# Patient Record
Sex: FEMALE | Race: WHITE | NOT HISPANIC OR LATINO | Employment: UNEMPLOYED
[De-identification: names, ages, dates, MRNs, and addresses within clinical notes are randomized per-mention and may not be internally consistent; named-entity substitution may affect disease eponyms.]

---

## 2018-01-02 ENCOUNTER — RECORDS - HEALTHEAST (OUTPATIENT)
Dept: ADMINISTRATIVE | Facility: OTHER | Age: 43
End: 2018-01-02

## 2020-03-11 ENCOUNTER — TRANSFERRED RECORDS (OUTPATIENT)
Dept: HEALTH INFORMATION MANAGEMENT | Facility: CLINIC | Age: 45
End: 2020-03-11

## 2020-03-16 NOTE — TELEPHONE ENCOUNTER
RECORDS RECEIVED FROM: External   Date of Appt: 5.11.2020   NOTES (FOR ALL VISITS) STATUS DETAILS   OFFICE NOTE from referring provider N/A    OFFICE NOTE from other specialist Care Everywhere 3.6.2020 John Mason   DISCHARGE SUMMARY from hospital N/A    DISCHARGE REPORT from the ER N/A    OPERATIVE REPORT N/A    MEDICATION LIST Care Everywhere    IMAGING  (FOR ALL VISITS)     EMG N/A    EEG N/A    MRI (HEAD, NECK, SPINE) In process 3.12.2020 Spine cervical, [facility]   LUMBAR PUNCTURE N/A    JACEY Scan N/A    CT (HEAD, NECK, SPINE) N/A       Action MJ 3.16.2020 12:36 PM   Action Taken Requested image from John and any related med recs from [facility].  3-16: resolved MRI from John in PACS

## 2020-03-25 ENCOUNTER — VIRTUAL VISIT (OUTPATIENT)
Dept: URGENT CARE | Facility: CLINIC | Age: 45
End: 2020-03-25
Payer: COMMERCIAL

## 2020-05-11 ENCOUNTER — VIRTUAL VISIT (OUTPATIENT)
Dept: NEUROLOGY | Facility: CLINIC | Age: 45
End: 2020-05-11
Payer: COMMERCIAL

## 2020-05-11 ENCOUNTER — PRE VISIT (OUTPATIENT)
Dept: NEUROLOGY | Facility: CLINIC | Age: 45
End: 2020-05-11

## 2020-05-11 DIAGNOSIS — G43.909 NONINTRACTABLE CHRONIC MIGRAINE: Primary | ICD-10-CM

## 2020-05-11 RX ORDER — ZOLMITRIPTAN 5 MG/1
TABLET, ORALLY DISINTEGRATING ORAL
COMMUNITY
Start: 2020-03-03 | End: 2020-07-02

## 2020-05-11 RX ORDER — TOPIRAMATE 25 MG/1
75 TABLET, FILM COATED ORAL
COMMUNITY
Start: 2020-05-01 | End: 2020-07-02

## 2020-05-11 RX ORDER — PROMETHAZINE HYDROCHLORIDE 12.5 MG/1
TABLET ORAL
COMMUNITY
Start: 2020-03-05 | End: 2023-10-19

## 2020-05-11 RX ORDER — ONDANSETRON 8 MG/1
8 TABLET, ORALLY DISINTEGRATING ORAL
COMMUNITY
Start: 2020-02-28 | End: 2023-10-19

## 2020-05-11 NOTE — LETTER
Date:June 8, 2020      Patient was self referred, no letter generated. Do not send.        [facility] Health Information

## 2020-05-11 NOTE — PATIENT INSTRUCTIONS
Plan:  Headache calender for frequency, severity and response  to treatment .   Stress reduction  drink water about 8-10 glasses per day, avoid caffeine/tea/alcohol. Avoid frequent (>2 days per week) acute analgesics use  Reduce computer screen exposure and frequent brekas  Exercise  Vitamin B2 (riboflavin) 400 mg daily OTC for headache prevention  Continue topiramate for migraine headache prevention  Acute migraine headache -sumatriptan injection. Do not take it the same day with zolmitriptan-wait at least 24 hours in between   Discussed headache common triggers and importance of rest, hydration, food triggers and sleep role in headache prevention. Non pharmacological approaches of stress reduction in headache prevention.   Follow up in 2-3 months or sooner if needed
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
## 2020-07-02 ENCOUNTER — VIRTUAL VISIT (OUTPATIENT)
Dept: NEUROLOGY | Facility: CLINIC | Age: 45
End: 2020-07-02
Payer: COMMERCIAL

## 2020-07-02 RX ORDER — OMEPRAZOLE 40 MG/1
40 CAPSULE, DELAYED RELEASE ORAL
COMMUNITY
Start: 2020-05-18 | End: 2023-10-19

## 2020-07-02 RX ORDER — TOPIRAMATE 25 MG/1
75 TABLET, FILM COATED ORAL AT BEDTIME
Qty: 90 TABLET | Refills: 6 | Status: SHIPPED | OUTPATIENT
Start: 2020-07-02 | End: 2020-08-04

## 2020-07-02 RX ORDER — SUMATRIPTAN 100 MG/1
100 TABLET, FILM COATED ORAL
Qty: 12 TABLET | Refills: 3 | Status: SHIPPED | OUTPATIENT
Start: 2020-07-02 | End: 2021-03-25

## 2020-07-02 NOTE — PATIENT INSTRUCTIONS
Plan:  Continue topiramate at 75 mg and monitor for any side effects especially mood changes, depression, vision changes, kidney stones, stay hydrated to decrease side effect of kidney stones. Call with any concerns.   Sumatriptan as needed. New prescription for sumatriptan oral sent and side effects reviewed-possible interaction with Prozac and risk of serotonin syndrome  Follow up in 2-3 months via video or sooner if needed    Post epidural injections headaches-If positional headache reoccur or getting worse -call CDI back. Stay hydrated.           Patient Education     Patient Education    Topiramate Oral capsule, extended-release    Topiramate Oral capsule, sprinkles    Topiramate Oral tablet  Topiramate Oral tablet  What is this medicine?  TOPIRAMATE (toe PYRE a mate) is used to treat seizures in adults or children with epilepsy. It is also used for the prevention of migraine headaches.  This medicine may be used for other purposes; ask your health care provider or pharmacist if you have questions.  What should I tell my health care provider before I take this medicine?  They need to know if you have any of these conditions:    bleeding disorders    cirrhosis of the liver or liver disease    diarrhea    glaucoma    kidney stones or kidney disease    low blood counts, like low white cell, platelet, or red cell counts    lung disease like asthma, obstructive pulmonary disease, emphysema    metabolic acidosis    on a ketogenic diet    schedule for surgery or a procedure    suicidal thoughts, plans, or attempt; a previous suicide attempt by you or a family member    an unusual or allergic reaction to topiramate, other medicines, foods, dyes, or preservatives    pregnant or trying to get pregnant    breast-feeding  How should I use this medicine?  Take this medicine by mouth with a glass of water. Follow the directions on the prescription label. Do not crush or chew. You may take this medicine with meals. Take your  medicine at regular intervals. Do not take it more often than directed.  Talk to your pediatrician regarding the use of this medicine in children. Special care may be needed. While this drug may be prescribed for children as young as 2 years of age for selected conditions, precautions do apply.  Overdosage: If you think you have taken too much of this medicine contact a poison control center or emergency room at once.  NOTE: This medicine is only for you. Do not share this medicine with others.  What if I miss a dose?  If you miss a dose, take it as soon as you can. If your next dose is to be taken in less than 6 hours, then do not take the missed dose. Take the next dose at your regular time. Do not take double or extra doses.  What may interact with this medicine?  Do not take this medicine with any of the following medications:    probenecid  This medicine may also interact with the following medications:    acetazolamide    alcohol    amitriptyline    aspirin and aspirin-like medicines    birth control pills    certain medicines for depression    certain medicines for seizures    certain medicines that treat or prevent blood clots like warfarin, enoxaparin, dalteparin, apixaban, dabigatran, and rivaroxaban    digoxin    hydrochlorothiazide    lithium    medicines for pain, sleep, or muscle relaxation    metformin    methazolamide    NSAIDS, medicines for pain and inflammation, like ibuprofen or naproxen    pioglitazone    risperidone  This list may not describe all possible interactions. Give your health care provider a list of all the medicines, herbs, non-prescription drugs, or dietary supplements you use. Also tell them if you smoke, drink alcohol, or use illegal drugs. Some items may interact with your medicine.  What should I watch for while using this medicine?  Visit your doctor or health care professional for regular checks on your progress. Do not stop taking this medicine suddenly. This increases the  risk of seizures if you are using this medicine to control epilepsy. Wear a medical identification bracelet or chain to say you have epilepsy or seizures, and carry a card that lists all your medicines.  This medicine can decrease sweating and increase your body temperature. Watch for signs of  sweating or fever, especially in children. Avoid extreme heat, hot baths, and saunas. Be careful about exercising, especially in hot weather. Contact your health care provider right away if you notice a fever or decrease in sweating.  You should drink plenty of fluids while taking this medicine. If you have had kidney stones in the past, this will help to reduce your chances of forming kidney stones.  If you have stomach pain, with nausea or vomiting and yellowing of your eyes or skin, call your doctor immediately.  You may get drowsy, dizzy, or have blurred vision. Do not drive, use machinery, or do anything that needs mental alertness until you know how this medicine affects you. To reduce dizziness, do not sit or stand up quickly, especially if you are an older patient. Alcohol can increase drowsiness and dizziness. Avoid alcoholic drinks.  If you notice blurred vision, eye pain, or other eye problems, seek medical attention at once for an eye exam.  The use of this medicine may increase the chance of suicidal thoughts or actions. Pay special attention to how you are responding while on this medicine. Any worsening of mood, or thoughts of suicide or dying should be reported to your health care professional right away.  This medicine may increase the chance of developing metabolic acidosis. If left untreated, this can cause kidney stones, bone disease, or slowed growth in children. Symptoms include breathing fast, fatigue, loss of appetite, irregular heartbeat, or loss of consciousness. Call your doctor immediately if you experience any of these side effects. Also, tell your doctor about any surgery you plan on  having while taking this medicine since this may increase your risk for metabolic acidosis.  Birth control pills may not work properly while you are taking this medicine. Talk to your doctor about using an extra method of birth control.  Women who become pregnant while using this medicine may enroll in the [place] Antiepileptic Drug Pregnancy Registry by calling 1-504.414.1625. This registry collects information about the safety of antiepileptic drug use during pregnancy.  What side effects may I notice from receiving this medicine?  Side effects that you should report to your doctor or health care professional as soon as possible:    allergic reactions like skin rash, itching or hives, swelling of the face, lips, or tongue    decreased sweating and/or rise in body temperature    depression    difficulty breathing, fast or irregular breathing patterns    difficulty speaking    difficulty walking or controlling muscle movements    hearing impairment    redness, blistering, peeling or loosening of the skin, including inside the mouth    tingling, pain or numbness in the hands or feet    unusual bleeding or bruising    unusually weak or tired    worsening of mood, thoughts or actions of suicide or dying  Side effects that usually do not require medical attention (report to your doctor or health care professional if they continue or are bothersome):    altered taste    back pain, joint or muscle aches and pains    diarrhea, or constipation    headache    loss of appetite    nausea    stomach upset, indigestion    tremors  This list may not describe all possible side effects. Call your doctor for medical advice about side effects. You may report side effects to FDA at 7-145-FDA-2014.  Where should I keep my medicine?  Keep out of the reach of children.  Store at room temperature between 15 and 30 degrees C (59 and 86 degrees F) in a tightly closed container. Protect from moisture. Throw away any unused medicine  after the expiration date.  NOTE:This sheet is a summary. It may not cover all possible information. If you have questions about this medicine, talk to your doctor, pharmacist, or health care provider. Copyright  2016 [employer] Standard           Patient Education     Sumatriptan tablets  Brand Names: Imitrex, Migraine Pack  What is this medicine?  SUMATRIPTAN (sumaya ma TRIP tan) is used to treat migraines with or without aura. An aura is a strange feeling or visual disturbance that warns you of an attack. It is not used to prevent migraines.  How should I use this medicine?  Take this medicine by mouth with a glass of water. Follow the directions on the prescription label. This medicine is taken at the first symptoms of a migraine. It is not for everyday use. If your migraine headache returns after one dose, you can take another dose as directed. You must leave at least 2 hours between doses, and do not take more than 100 mg as a single dose. Do not take more than 200 mg total in any 24 hour period. If there is no improvement at all after the first dose, do not take a second dose without talking to your doctor or health care professional. Do not take your medicine more often than directed.  Talk to your pediatrician regarding the use of this medicine in children. Special care may be needed.  What side effects may I notice from receiving this medicine?  Side effects that you should report to your doctor or health care professional as soon as possible:    allergic reactions like skin rash, itching or hives, swelling of the face, lips, or tongue    bloody or watery diarrhea    hallucination, loss of contact with reality    pain, tingling, numbness in the face, hands, or feet    seizures    signs and symptoms of a blood clot such as breathing problems; changes in vision; chest pain; severe, sudden headache; pain, swelling, warmth in the leg; trouble speaking; sudden numbness or weakness of the face, arm, or leg    signs and  symptoms of a dangerous change in heartbeat or heart rhythm like chest pain; dizziness; fast or irregular heartbeat; palpitations, feeling faint or lightheaded; falls; breathing problems    signs and symptoms of a stroke like changes in vision; confusion; trouble speaking or understanding; severe headaches; sudden numbness or weakness of the face, arm, or leg; trouble walking; dizziness; loss of balance or coordination    stomach pain  Side effects that usually do not require medical attention (report to your doctor or health care professional if they continue or are bothersome):    changes in taste    facial flushing    headache    muscle cramps    muscle pain    nausea, vomiting    weak or tired  What may interact with this medicine?  Do not take this medicine with any of the following medicines:    cocaine    ergot alkaloids like dihydroergotamine, ergonovine, ergotamine, methylergonovine    feverfew    MAOIs like Carbex, Eldepryl, Marplan, Nardil, and Parnate    other medicines for migraine headache like almotriptan, eletriptan, frovatriptan, naratriptan, rizatriptan, zolmitriptan    tryptophan  This medicine may also interact with the following medications:    certain medicines for depression, anxiety, or psychotic disturbances  What if I miss a dose?  This does not apply; this medicine is not for regular use.  Where should I keep my medicine?  Keep out of the reach of children.  Store at room temperature between 2 and 30 degrees C (36 and 86 degrees F). Throw away any unused medicine after the expiration date.  What should I tell my health care provider before I take this medicine?  They need to know if you have any of these conditions:    circulation problems in fingers and toes    diabetes    heart disease    high blood pressure    high cholesterol    history of irregular heartbeat    history of stroke    kidney disease    liver disease    postmenopausal or surgical removal of uterus and  ovaries    seizures    smoke tobacco    stomach or intestine problems    an unusual or allergic reaction to sumatriptan, other medicines, foods, dyes, or preservatives    pregnant or trying to get pregnant    breast-feeding  What should I watch for while using this medicine?  Only take this medicine for a migraine headache. Take it if you get warning symptoms or at the start of a migraine attack. It is not for regular use to prevent migraine attacks.  You may get drowsy or dizzy. Do not drive, use machinery, or do anything that needs mental alertness until you know how this medicine affects you. To reduce dizzy or fainting spells, do not sit or stand up quickly, especially if you are an older patient. Alcohol can increase drowsiness, dizziness and flushing. Avoid alcoholic drinks.  Smoking cigarettes may increase the risk of heart-related side effects from using this medicine.  If you take migraine medicines for 10 or more days a month, your migraines may get worse. Keep a diary of headache days and medicine use. Contact your healthcare professional if your migraine attacks occur more frequently.  NOTE:This sheet is a summary. It may not cover all possible information. If you have questions about this medicine, talk to your doctor, pharmacist, or health care provider. Copyright  2019 [employer]

## 2020-07-02 NOTE — LETTER
Date:July 29, 2020      Patient was self referred, no letter generated. Do not send.        [facility] Health Information
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
## 2020-09-18 ENCOUNTER — VIRTUAL VISIT (OUTPATIENT)
Dept: NEUROLOGY | Facility: CLINIC | Age: 45
End: 2020-09-18
Payer: COMMERCIAL

## 2020-09-18 NOTE — LETTER
Date:September 30, 2020      Patient was self referred, no letter generated. Do not send.        [facility] Health Information

## 2020-09-18 NOTE — PATIENT INSTRUCTIONS
Plan:  A trial of tapering topiramate off over time.   We'll try to decrease to 50 mg at bedtime for a month and if tolerated decrease to 25 mg at bedtime and if tolerated than stop it. If not tolerated go back to original dose and we'll attempt the taper off later  Sumatriptan as needed. Limit use to no more than 9 days per month.   Follow up in 3-6 months or sooner if needed

## 2020-09-18 NOTE — PROGRESS NOTES
"Deepika Oliva is a 45 year old female who is being evaluated via a billable video visit.      The patient has been notified of following:     \"This video visit will be conducted via a call between you and your physician/provider. We have found that certain health care needs can be provided without the need for an in-person physical exam.  This service lets us provide the care you need with a video conversation.  If a prescription is necessary we can send it directly to your pharmacy.  If lab work is needed we can place an order for that and you can then stop by our lab to have the test done at a later time.    Video visits are billed at different rates depending on your insurance coverage.  Please reach out to your insurance provider with any questions.    If during the course of the call the physician/provider feels a video visit is not appropriate, you will not be charged for this service.\"    Patient has given verbal consent for Video visit?YES  How would you like to obtain your AVS? MyChart        Video-Visit Details    Type of service:  Video Visit    Video Start Time:1:22 PM     Video End Time:1:32 PM       Originating Location (pt. Location): Home    Distant Location (provider location):  [facility] NEUROLOGY     Platform used for Video Visit: KELSEY Mae    Reason for visit:  Headache follow-up. This visit was conducted via synchronous video visit due to the current COVID-19 crisis to reduce patient risk.  Verbal consent was obtained.     Interval history:  This is a 45-year-old female who presents to [facility] headache clinic for headache follow-up.  Initial virtual headache clinic visit on 5/11/2020, see note for details.  Headache history since teens migraine headaches and TMJ.  Has been seen at [facility] neurological clinic for headaches  At the initial visit patient on topiramate for a couple of weeks and decided to continue with topiramate and she finding it to be effective.  It was " "recommended a trial of sumatriptan injections for acute [place] treatment.  Treatments Tried:    Sumatriptan oral, injections, nasal in the past stopped working 10 years ago  Zomig as needed +promethazine or ondasetron  Midrin  TMJ treatment   Massages   Amitriptyline-stopped due to \"slowing guts down\"  Trazadone  Fioricet -was stopped due to side effects  Zanaflex -only took it when problems for TMJ  propranolol ER -  Topiramate started 3 months ago and 75 mg at bedtime currently and reports that sweets don't taste good but no mood changes  Patient reports that she did not have any headaches and on topiramate 75 mg at bedtime and doing well. Has been on topiramate for about 7 months.  Topiramate related side effects reported.  Patient denies history of kidney stones.  Sumatriptan injectable use twice since 7/2/2020 and help well both times and no side effects reported    PMH, allergies and current prescription medications reviewed    10 point ROS of systems including Constitutional, Eyes, Respiratory, Cardiovascular, Gastroenterology, Genitourinary, Integumentary, Muscularskeletal, Psychiatric were reviewed and no concerns reported today unless as mentioned in the interval history    Video Exam:   GENERAL: Healthy, alert and no distress  EYES: Eyes grossly normal to inspection.   RESP: No audible wheeze, cough, or visible cyanosis.  No visible retractions or increased work of breathing.    NEURO: Cranial nerves grossly intact-face is symmetrical, symmetrical eyebrow raise, no apparent weakness.  Mentation and speech appropriate for age.  PSYCH: Mentation appears normal, affect normal/bright, judgement and insight intact, normal speech and appearance well-groomed.      A/P:  Migraine headache improved with starting topiramate 7 months ago.  Acute migraine treatment-sumatriptan injectable appears to be effective and needed to use twice since her last headache clinic visit.  No new headache symptoms reported " today  Headache treatment plan reviewed with the patient:  A trial of tapering topiramate off over time when feels ready  We'll try to decrease to 50 mg at bedtime for a month and if tolerated decrease to 25 mg at bedtime and if tolerated than stop it. If not tolerated go back to original dose and we'll attempt the taper off later  Sumatriptan as needed. Limit use to no more than 9 days per month.   Follow up in 3-6 months or sooner if needed    I discussed all my recommendations with Deepika Oliva who verbalizes understanding and comfortable with the plan.  All of patient's questions were answered from the best of my knowledge.  Patient is in agreement with the plan.     I spent a total of 10 minutes for telemedicine consult with the patient during today s virtual meeting. Over 50% of this time was spent counseling the patient and/or coordinating care    LORRIE Eldridge [facility]

## 2020-09-18 NOTE — LETTER
"9/18/2020       RE: Deepika Oliva  [address] [address]     Dear Colleague,    Thank you for referring your patient, Deepika Oliva, to the [facility] NEUROLOGY at [facility]. Please see a copy of my visit note below.    Deepika Oliva is a 45 year old female who is being evaluated via a billable video visit.      The patient has been notified of following:     \"This video visit will be conducted via a call between you and your physician/provider. We have found that certain health care needs can be provided without the need for an in-person physical exam.  This service lets us provide the care you need with a video conversation.  If a prescription is necessary we can send it directly to your pharmacy.  If lab work is needed we can place an order for that and you can then stop by our lab to have the test done at a later time.    Video visits are billed at different rates depending on your insurance coverage.  Please reach out to your insurance provider with any questions.    If during the course of the call the physician/provider feels a video visit is not appropriate, you will not be charged for this service.\"    Patient has given verbal consent for Video visit?YES  How would you like to obtain your AVS? MyChart        Video-Visit Details    Type of service:  Video Visit    Video Start Time:1:22 PM     Video End Time:1:32 PM       Originating Location (pt. Location): Home    Distant Location (provider location):  [facility] NEUROLOGY     Platform used for Video Visit: Josie Patrick, EMT    Reason for visit:  Headache follow-up. This visit was conducted via synchronous video visit due to the current COVID-19 crisis to reduce patient risk.  Verbal consent was obtained.     Interval history:  This is a 45-year-old female who presents to [facility] headache clinic for headache follow-up.  Initial virtual headache clinic visit on " "5/11/2020, see note for details.  Headache history since teens migraine headaches and TMJ.  Has been seen at [facility] neurological clinic for headaches  At the initial visit patient on topiramate for a couple of weeks and decided to continue with topiramate and she finding it to be effective.  It was recommended a trial of sumatriptan injections for acute [place] treatment.  Treatments Tried:    Sumatriptan oral, injections, nasal in the past stopped working 10 years ago  Zomig as needed +promethazine or ondasetron  Midrin  TMJ treatment   Massages   Amitriptyline-stopped due to \"slowing guts down\"  Trazadone  Fioricet -was stopped due to side effects  Zanaflex -only took it when problems for TMJ  propranolol ER -  Topiramate started 3 months ago and 75 mg at bedtime currently and reports that sweets don't taste good but no mood changes  Patient reports that she did not have any headaches and on topiramate 75 mg at bedtime and doing well. Has been on topiramate for about 7 months.  Topiramate related side effects reported.  Patient denies history of kidney stones.  Sumatriptan injectable use twice since 7/2/2020 and help well both times and no side effects reported    PMH, allergies and current prescription medications reviewed    10 point ROS of systems including Constitutional, Eyes, Respiratory, Cardiovascular, Gastroenterology, Genitourinary, Integumentary, Muscularskeletal, Psychiatric were reviewed and no concerns reported today unless as mentioned in the interval history    Video Exam:   GENERAL: Healthy, alert and no distress  EYES: Eyes grossly normal to inspection.   RESP: No audible wheeze, cough, or visible cyanosis.  No visible retractions or increased work of breathing.    NEURO: Cranial nerves grossly intact-face is symmetrical, symmetrical eyebrow raise, no apparent weakness.  Mentation and speech appropriate for age.  PSYCH: Mentation appears normal, affect normal/bright, judgement and insight intact, " normal speech and appearance well-groomed.      A/P:  Migraine headache improved with starting topiramate 7 months ago.  Acute migraine treatment-sumatriptan injectable appears to be effective and needed to use twice since her last headache clinic visit.  No new headache symptoms reported today  Headache treatment plan reviewed with the patient:  A trial of tapering topiramate off over time when feels ready  We'll try to decrease to 50 mg at bedtime for a month and if tolerated decrease to 25 mg at bedtime and if tolerated than stop it. If not tolerated go back to original dose and we'll attempt the taper off later  Sumatriptan as needed. Limit use to no more than 9 days per month.   Follow up in 3-6 months or sooner if needed    I discussed all my recommendations with Deepika Oliva who verbalizes understanding and comfortable with the plan.  All of patient's questions were answered from the best of my knowledge.  Patient is in agreement with the plan.     I spent a total of 10 minutes for telemedicine consult with the patient during today s virtual meeting. Over 50% of this time was spent counseling the patient and/or coordinating care    LORRIE Eldridge CNP  [facility]        Again, thank you for allowing me to participate in the care of your patient.      Sincerely,    LORRIE Kelly CNP

## 2021-01-15 ENCOUNTER — HEALTH MAINTENANCE LETTER (OUTPATIENT)
Age: 46
End: 2021-01-15

## 2021-01-27 ENCOUNTER — MYC MEDICAL ADVICE (OUTPATIENT)
Dept: NEUROLOGY | Facility: CLINIC | Age: 46
End: 2021-01-27

## 2021-01-27 NOTE — TELEPHONE ENCOUNTER
Rx Authorization:    Requested Medication/ Dose: Topamax 5 MG/ML    Date last refill ordered: 8/14/20    Quantity ordered: 450 ML    # refills: 6    Date of last clinic visit with ordering provider: 9/18/20    Date of next clinic visit with ordering provider: F/U 3-6 months    All pertinent protocol data (lab date/result):     Include pertinent information from patients message:

## 2021-02-01 RX ORDER — TOPIRAMATE 25 MG/1
75 TABLET, FILM COATED ORAL AT BEDTIME
Qty: 90 TABLET | Refills: 5 | Status: SHIPPED | OUTPATIENT
Start: 2021-02-01 | End: 2021-03-25

## 2021-02-01 RX ORDER — TOPIRAMATE 25 MG/1
TABLET, FILM COATED ORAL
Qty: 90 TABLET | Refills: 6 | OUTPATIENT
Start: 2021-02-01

## 2021-02-01 NOTE — TELEPHONE ENCOUNTER
[facility] Call Center    Phone Message    May a detailed message be left on voicemail: yes     Reason for Call: Medication Question or concern regarding medication   Prescription Clarification  Name of Medication: topiramate (TOPAMAX) 5 MG/ML suspension (FV COMPOUNDED)  Prescribing Provider: Corinna ANDREW CNP   Pharmacy: [id number] [address] AT [facility] [address]   What on the order needs clarification? Deepika stated that her pharmacy is unable to fill in liquid form. This medication can be refilled in pill form. Deepika also stated that she is out of this medication. Please call Deepika with any questions or concerns.    Action Taken: Message routed to:  [facility]    Travel Screening: Not Applicable

## 2021-03-25 ENCOUNTER — VIRTUAL VISIT (OUTPATIENT)
Dept: NEUROLOGY | Facility: CLINIC | Age: 46
End: 2021-03-25
Payer: COMMERCIAL

## 2021-03-25 DIAGNOSIS — G43.909 NONINTRACTABLE CHRONIC MIGRAINE: ICD-10-CM

## 2021-03-25 PROCEDURE — 99213 OFFICE O/P EST LOW 20 MIN: CPT | Mod: 95 | Performed by: NURSE PRACTITIONER

## 2021-03-25 RX ORDER — SUMATRIPTAN 100 MG/1
100 TABLET, FILM COATED ORAL
Qty: 12 TABLET | Refills: 9 | Status: SHIPPED | OUTPATIENT
Start: 2021-03-25 | End: 2022-03-02

## 2021-03-25 RX ORDER — FLUTICASONE PROPIONATE 50 MCG
2 SPRAY, SUSPENSION (ML) NASAL DAILY
COMMUNITY
Start: 2020-11-28 | End: 2022-12-19

## 2021-03-25 RX ORDER — ARIPIPRAZOLE 2 MG/1
2 TABLET ORAL DAILY
COMMUNITY
Start: 2020-12-16 | End: 2022-07-22

## 2021-03-25 RX ORDER — TOPIRAMATE 25 MG/1
100 TABLET, FILM COATED ORAL AT BEDTIME
Qty: 120 TABLET | Refills: 5 | Status: SHIPPED | OUTPATIENT
Start: 2021-03-25 | End: 2021-09-30

## 2021-03-25 NOTE — LETTER
"3/25/2021       RE: Deepika Oliva  [address] [id number]     Dear Colleague,    Thank you for referring your patient, Deepika Oliva, to the [facility] NEUROLOGY CLINIC [place] at [facility]. Please see a copy of my visit note below.    Deepika is a 45 year old who is being evaluated via a billable video visit.      How would you like to obtain your AVS? MyChart  If the video visit is dropped, the invitation should be resent by: Send to e-mail at: [email or web address]  Will anyone else be joining your video visit? No      Video Start Time: 2:25 PM  Video-Visit Details    Type of service:  Video Visit    Video End Time:2:48 PM    Originating Location (pt. Location): Home    Distant Location (provider location):  [facility] NEUROLOGY CLINIC [place]     Platform used for Video Visit: [facility]    Chief Complaint   Patient presents with     RECHECK     VIDEO VISIT RETURN      Corey WESTBROOK    CC:  Headache follow up     Interval History:  Initial [facility]ache Clinic visit on 5/11/2020, see note for details.     Headache history since teens migraine headaches and TMJ.  Has been seen at [facility] neurological clinic for headaches  At the initial visit patient on topiramate for a couple of weeks and decided to continue with topiramate and she finding it to be effective.  It was recommended a trial of sumatriptan injections for acute migraine treatment.  Treatments Tried:    Sumatriptan oral, injections, nasal in the past stopped working 10 years ago  Zomig as needed +promethazine or ondasetron  Midrin  TMJ treatment   Massages   Amitriptyline-stopped due to \"slowing guts down\"  Trazadone  Zomig -have not taking it for a while less effective  Fioricet -was stopped due to side effects  Zanaflex -only took it when problems for TMJ  propranolol ER -  Topiramate started 3 months ago and 75 mg at bedtime currently and " reports that sweets don't taste good but no mood changes    Cervical spine fusion in October of 2020 and healed well.   Today reports that Headaches wise doing well 1-2 per week and not as severe. Duration an hour with sumatriptan.   Patient is on topiramate 75 mg at bedtime and did not taper it off.Still helps with headaches.  No side effects. Patient has been hydrated.   Patient is happy with her current plan.     Headache treatment Plan:  May try to increase topirrame to 100 mg at bedtime if tolerated and if not -go back to 75 mg at bedtime. Updated prescription.   Sumatriptan oral or injectable as needed   Headache log  Follow up in 6-9 months or sooner if needed    PMH, allergies and current prescription medications reviewed    10 point ROS of systems including Constitutional, Eyes, Respiratory, Cardiovascular, Gastroenterology, Genitourinary, Integumentary, MSK, Psychiatric were reviewed and no new concerns reported today unless as mentioned in the interval history    Patient appears alert and no in apparent acute distress,  mentation appears normal, judgement and insight intact, normal speech.    A/P: As discussed above    I discussed all my recommendations with Deepika Oliva who verbalizes understanding and comfortable with the plan.  All of patient's questions were answered from the best of my knowledge.  Patient is in agreement with the plan.     24 minutes spent on the date of the encounter doing chart review, history and exam, documentation and further activities as noted above    LORRIE Potter, CNP [facility]  Headache certified  [facility]                Again, thank you for allowing me to participate in the care of your patient.      Sincerely,    LORRIE Kelyl CNP

## 2021-03-25 NOTE — PROGRESS NOTES
"Deepika is a 45 year old who is being evaluated via a billable video visit.      How would you like to obtain your AVS? MyChart  If the video visit is dropped, the invitation should be resent by: Send to e-mail at: [email or web address]  Will anyone else be joining your video visit? No      Video Start Time: 2:25 PM  Video-Visit Details    Type of service:  Video Visit    Video End Time:2:48 PM    Originating Location (pt. Location): Home    Distant Location (provider location):  [facility] NEUROLOGY CLINIC [place]     Platform used for Video Visit: [facility]    Chief Complaint   Patient presents with     RECHECK     VIDEO VISIT RETURN      Corey WESTBROOK    CC:  Headache follow up     Interval History:  Initial [facility] Headache Clinic visit on 5/11/2020, see note for details.     Headache history since teens migraine headaches and TMJ.  Has been seen at [facility] neurological clinic for headaches  At the initial visit patient on topiramate for a couple of weeks and decided to continue with topiramate and she finding it to be effective.  It was recommended a trial of sumatriptan injections for acute migraine treatment.  Treatments Tried:    Sumatriptan oral, injections, nasal in the past stopped working 10 years ago  Zomig as needed +promethazine or ondasetron  Midrin  TMJ treatment   Massages   Amitriptyline-stopped due to \"slowing guts down\"  Trazadone  Zomig -have not taking it for a while less effective  Fioricet -was stopped due to side effects  Zanaflex -only took it when problems for TMJ  propranolol ER -  Topiramate started 3 months ago and 75 mg at bedtime currently and reports that sweets don't taste good but no mood changes    Cervical spine fusion in October of 2020 and healed well.   Today reports that Headaches wise doing well 1-2 per week and not as severe. Duration an hour with sumatriptan.   Patient is on topiramate 75 mg at bedtime and did not taper it off.Still helps with headaches.  No side " effects. Patient has been hydrated.   Patient is happy with her current plan.     Headache treatment Plan:  May try to increase topirrame to 100 mg at bedtime if tolerated and if not -go back to 75 mg at bedtime. Updated prescription.   Sumatriptan oral or injectable as needed   Headache log  Follow up in 6-9 months or sooner if needed    PMH, allergies and current prescription medications reviewed    10 point ROS of systems including Constitutional, Eyes, Respiratory, Cardiovascular, Gastroenterology, Genitourinary, Integumentary, MSK, Psychiatric were reviewed and no new concerns reported today unless as mentioned in the interval history    Patient appears alert and no in apparent acute distress,  mentation appears normal, judgement and insight intact, normal speech.    A/P: As discussed above    I discussed all my recommendations with Deepika Oliva who verbalizes understanding and comfortable with the plan.  All of patient's questions were answered from the best of my knowledge.  Patient is in agreement with the plan.     24 minutes spent on the date of the encounter doing chart review, history and exam, documentation and further activities as noted above    LORRIE Potter, CNP [facility]  Headache certified  [facility]

## 2021-03-25 NOTE — LETTER
Date:June 18, 2021      Patient was self referred, no letter generated. Do not send.        [facility] Health Information

## 2021-04-12 NOTE — PATIENT INSTRUCTIONS
Headache treatment Plan:  May try to increase topirrame to 100 mg at bedtime if tolerated and if not -go back to 75 mg at bedtime. Updated prescription.   Sumatriptan oral or injectable as needed   Headache log  Follow up in 6-9 months or sooner if needed

## 2021-09-05 ENCOUNTER — HEALTH MAINTENANCE LETTER (OUTPATIENT)
Age: 46
End: 2021-09-05

## 2021-09-28 NOTE — TELEPHONE ENCOUNTER
Rx Authorization:  Requested Medication/ Dose TOPIRAMATE 25MG TABLETS  Date last refill ordered: 3/25/21  Quantity ordered: 120 tabs  # refills: 5  Date of last clinic visit with ordering provider: 3/25/21  Date of next clinic visit with ordering provider:   All pertinent protocol data (lab date/result):   Include pertinent information from patients message:

## 2021-09-30 RX ORDER — TOPIRAMATE 25 MG/1
TABLET, FILM COATED ORAL
Qty: 120 TABLET | Refills: 5 | Status: SHIPPED | OUTPATIENT
Start: 2021-09-30 | End: 2022-03-02 | Stop reason: DRUGHIGH

## 2021-10-30 ENCOUNTER — HEALTH MAINTENANCE LETTER (OUTPATIENT)
Age: 46
End: 2021-10-30

## 2021-12-02 ENCOUNTER — VIRTUAL VISIT (OUTPATIENT)
Dept: NEUROLOGY | Facility: CLINIC | Age: 46
End: 2021-12-02
Payer: COMMERCIAL

## 2021-12-02 DIAGNOSIS — G43.109 MIGRAINE WITH AURA AND WITHOUT STATUS MIGRAINOSUS, NOT INTRACTABLE: Primary | ICD-10-CM

## 2021-12-02 PROCEDURE — 99213 OFFICE O/P EST LOW 20 MIN: CPT | Mod: 95 | Performed by: NURSE PRACTITIONER

## 2021-12-02 RX ORDER — KETOROLAC TROMETHAMINE 15 MG/ML
15 INJECTION, SOLUTION INTRAMUSCULAR; INTRAVENOUS
Qty: 1 ML | Refills: 0 | OUTPATIENT
Start: 2021-12-02 | End: 2022-03-02

## 2021-12-02 RX ORDER — KETOROLAC TROMETHAMINE 10 MG/1
10 TABLET, FILM COATED ORAL EVERY 6 HOURS PRN
Qty: 20 TABLET | Refills: 0 | Status: SHIPPED | OUTPATIENT
Start: 2021-12-02 | End: 2022-10-07

## 2021-12-02 RX ORDER — KETOROLAC TROMETHAMINE 15.75 MG/1
SPRAY, METERED NASAL
Qty: 1 EACH | Refills: 9 | Status: SHIPPED | OUTPATIENT
Start: 2021-12-02 | End: 2022-03-02

## 2021-12-02 RX ORDER — RIZATRIPTAN BENZOATE 5 MG/1
5-10 TABLET, ORALLY DISINTEGRATING ORAL
Qty: 18 TABLET | Refills: 6 | Status: SHIPPED | OUTPATIENT
Start: 2021-12-02 | End: 2022-05-24

## 2021-12-02 ASSESSMENT — HEADACHE IMPACT TEST (HIT 6)
HIT6 TOTAL SCORE: 60
WHEN YOU HAVE A HEADACHE HOW OFTEN DO YOU WISH YOU COULD LIE DOWN: SOMETIMES
HOW OFTEN DO HEADACHES LIMIT YOUR DAILY ACTIVITIES: SOMETIMES
WHEN YOU HAVE HEADACHES HOW OFTEN IS THE PAIN SEVERE: SOMETIMES
HOW OFTEN DID HEADACHS LIMIT CONCENTRATION ON WORK OR DAILY ACTIVITY: SOMETIMES
HOW OFTEN HAVE YOU FELT FED UP OR IRRITATED BECAUSE OF YOUR HEADACHES: SOMETIMES
HOW OFTEN HAVE YOU FELT TOO TIRED TO WORK BECAUSE OF YOUR HEADACHES: SOMETIMES

## 2021-12-02 NOTE — LETTER
12/2/2021       RE: Deepika Oliva  [address] [address]     Dear Colleague,    Thank you for referring your patient, Deepika Oliva, to the [facility] [place] at [facility]. Please see a copy of my visit note below.    Deepika is a 46 year old who is being evaluated via a billable video visit.      How would you like to obtain your AVS? MyChart  If the video visit is dropped, the invitation should be resent by: Text to cell phone: [phone]   Will anyone else be joining your video visit? No      Video Start Time: 7:39 AM  Video-Visit Details    Type of service:  Video Visit    Video End Time:8:07 AM    Originating Location (pt. Location): Home    Distant Location (provider location):  [facility] [place]     Platform used for Video Visit: [employer]    Last Patient-Answered HIT-6 Questionnaire  HIT-6 12/2/2021   When you have headaches, how often is the pain severe 10   How often do headaches limit your ability to do usual daily activities including household work, work, school, or social activities? 10   When you have a headache, how often do you wish you could lie down? 10   In the past 4 weeks, how often have you felt too tired to do work or daily activities because of your headaches 10   In the past 4 weeks, how often have you felt fed up or irritated because of your headaches 10   In the past 4 weeks, how often did headaches limit your ability to concentrate on work or daily activities 10   HIT-6 Total Score 60       MIGRAINE DISABILITY ASSESSMENT (MIDAS)    On how many days in the last 3 months did you miss work or school because of your headaches?  10    How many days in the last 3 months was your productivity at work or school reduced by half or more because of your headaches? (Do not include days you counted in question 1 where you missed work or  "school.)  20    On how many days in the last 3 months did you not do household work (such as housework, home repairs and maintenance, shopping, caring for children and relatives) because of your headaches?  30    How many days in the last 3 months was your productivity in household work reduced by half or more because of your headaches? (Do not include days you counted in question 3 where you did not do household work).  30    On how many days in the last 3 months did you miss family, social, or lesiure activities because of your headaches?  5    MIDAS Total Score:     On how many days in the last 3 months did you have a headache? (If a headache lasted more than 1 day, count each day.)   30    On a scale of 0 - 10, on average how painful were these headaches (where 0 = no pain at all, and 10 = pain as bad as it can be.)  6    [facility] visit note  ED visits twice in September 2021 for headache, note from 9/26/2021 visit reviewed  Last Headache Clinic visit on 3/25/2021 see note for details.   Headache frequency about 2/per week and duration about an hour but sumatriptan does not appear to be working    Treatments Tried:    Sumatriptan oral, injections, nasal in the past stopped working 10 years ago  Zomig as needed +promethazine or ondasetron  Midrin  TMJ treatment   Massages   Amitriptyline-stopped due to \"slowing guts down\"  Trazadone  Zomig -have not taking it for a while less effective  Fioricet -was stopped due to side effects  Zanaflex -only took it when problems for TMJ  propranolol ER -  Topiramate started 12 months ago and 100 mg at bedtime currently     Topiramate helping 100 mg at bedtime and no side effects.   Have not try rizatriptan.   Toradol injections work well. Have not tried toradol pills/nasal.   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  Ok to take Nurtec the same day with ketorolac and rizatriptan   Side effects reviewed    Continue with Topiramate for prevention    Follow up in 3 months or sooner if needed     Patient is alert and no in apparent acute distress,  mentation appears normal, judgement and insight intact, normal speech.    I discussed all my recommendations with Deepika Oliva who verbalizes understanding and comfortable with the plan.  All of patient's questions were answered from the best of my knowledge.  Patient is in agreement with the plan.     29 minutes spent on the date of the encounter doing video access, chart  review, meds review, treatment plan, documentation and further activities as noted above    LORRIE Potter, CNP [facility]  Headache certified  [facility]                         Again, thank you for allowing me to participate in the care of your patient.      Sincerely,    LORRIE Kelly CNP

## 2021-12-02 NOTE — PROGRESS NOTES
Deepika is a 46 year old who is being evaluated via a billable video visit.      How would you like to obtain your AVS? MyChart  If the video visit is dropped, the invitation should be resent by: Text to cell phone: [phone]   Will anyone else be joining your video visit? No      Video Start Time: 7:39 AM  Video-Visit Details    Type of service:  Video Visit    Video End Time:8:07 AM    Originating Location (pt. Location): Home    Distant Location (provider location):  [facility]     Platform used for Video Visit: [facility]    Last Patient-Answered HIT-6 Questionnaire  HIT-6 12/2/2021   When you have headaches, how often is the pain severe 10   How often do headaches limit your ability to do usual daily activities including household work, work, school, or social activities? 10   When you have a headache, how often do you wish you could lie down? 10   In the past 4 weeks, how often have you felt too tired to do work or daily activities because of your headaches 10   In the past 4 weeks, how often have you felt fed up or irritated because of your headaches 10   In the past 4 weeks, how often did headaches limit your ability to concentrate on work or daily activities 10   HIT-6 Total Score 60       MIGRAINE DISABILITY ASSESSMENT (MIDAS)    On how many days in the last 3 months did you miss work or school because of your headaches?  10    How many days in the last 3 months was your productivity at work or school reduced by half or more because of your headaches? (Do not include days you counted in question 1 where you missed work or school.)  20    On how many days in the last 3 months did you not do household work (such as housework, home repairs and maintenance, shopping, caring for children and relatives) because of your headaches?  30    How many days in the last 3 months was your productivity in household work reduced by half or more because of your headaches? (Do not include  "days you counted in question 3 where you did not do household work).  30    On how many days in the last 3 months did you miss family, social, or lesiure activities because of your headaches?  5    MIDAS Total Score:     On how many days in the last 3 months did you have a headache? (If a headache lasted more than 1 day, count each day.)   30    On a scale of 0 - 10, on average how painful were these headaches (where 0 = no pain at all, and 10 = pain as bad as it can be.)  6    [facility] visit note  ED visits twice in September 2021 for headache, note from 9/26/2021 visit reviewed  Last Headache Clinic visit on 3/25/2021 see note for details.   Headache frequency about 2/per week and duration about an hour but sumatriptan does not appear to be working    Treatments Tried:    Sumatriptan oral, injections, nasal in the past stopped working 10 years ago  Zomig as needed +promethazine or ondasetron  Midrin  TMJ treatment   Massages   Amitriptyline-stopped due to \"slowing guts down\"  Trazadone  Zomig -have not taking it for a while less effective  Fioricet -was stopped due to side effects  Zanaflex -only took it when problems for TMJ  propranolol ER -  Topiramate started 12 months ago and 100 mg at bedtime currently     Topiramate helping 100 mg at bedtime and no side effects.   Have not try rizatriptan.   Toradol injections work well. Have not tried toradol pills/nasal.   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  Ok to take Nurtec the same day with ketorolac and rizatriptan   Side effects reviewed    Continue with Topiramate for prevention    Follow up in 3 months or sooner if needed     Patient is alert and no in apparent acute distress,  mentation appears normal, judgement and insight intact, normal speech.    I " discussed all my recommendations with Deepika Oliva who verbalizes understanding and comfortable with the plan.  All of patient's questions were answered from the best of my knowledge.  Patient is in agreement with the plan.     29 minutes spent on the date of the encounter doing video access, chart  review, meds review, treatment plan, documentation and further activities as noted above    LORRIE Potter, CNP [facility]  Headache certified  [facility]

## 2021-12-02 NOTE — LETTER
Date:January 12, 2022      Patient was self referred, no letter generated. Do not send.        [facility] Health Information

## 2022-02-19 ENCOUNTER — HEALTH MAINTENANCE LETTER (OUTPATIENT)
Age: 47
End: 2022-02-19

## 2022-03-02 ENCOUNTER — VIRTUAL VISIT (OUTPATIENT)
Dept: NEUROLOGY | Facility: CLINIC | Age: 47
End: 2022-03-02
Payer: COMMERCIAL

## 2022-03-02 DIAGNOSIS — G43.709 CHRONIC MIGRAINE WITHOUT AURA WITHOUT STATUS MIGRAINOSUS, NOT INTRACTABLE: Primary | ICD-10-CM

## 2022-03-02 PROBLEM — K21.9 GASTROESOPHAGEAL REFLUX DISEASE WITHOUT ESOPHAGITIS: Status: ACTIVE | Noted: 2019-11-01

## 2022-03-02 PROBLEM — M47.22 CERVICAL SPONDYLOSIS WITH RADICULOPATHY: Status: ACTIVE | Noted: 2020-10-23

## 2022-03-02 PROBLEM — K30 DELAYED GASTRIC EMPTYING: Status: ACTIVE | Noted: 2022-03-02

## 2022-03-02 PROBLEM — R05.3 CHRONIC COUGH: Status: ACTIVE | Noted: 2019-11-01

## 2022-03-02 PROBLEM — K31.84 GASTROPARESIS: Status: ACTIVE | Noted: 2022-03-02

## 2022-03-02 PROCEDURE — 99213 OFFICE O/P EST LOW 20 MIN: CPT | Mod: 95 | Performed by: NURSE PRACTITIONER

## 2022-03-02 RX ORDER — TRAZODONE HYDROCHLORIDE 100 MG/1
50 TABLET ORAL AT BEDTIME
COMMUNITY
Start: 2022-02-24 | End: 2022-07-22

## 2022-03-02 RX ORDER — TRETINOIN 0.5 MG/G
0.05 CREAM TOPICAL DAILY
COMMUNITY
Start: 2022-02-04

## 2022-03-02 RX ORDER — SERTRALINE HYDROCHLORIDE 100 MG/1
150 TABLET, FILM COATED ORAL DAILY
COMMUNITY
Start: 2022-02-01 | End: 2022-07-22

## 2022-03-02 RX ORDER — TOPIRAMATE 100 MG/1
100 TABLET, FILM COATED ORAL AT BEDTIME
Qty: 30 TABLET | Refills: 9 | Status: SHIPPED | OUTPATIENT
Start: 2022-03-02 | End: 2022-05-24

## 2022-03-02 ASSESSMENT — HEADACHE IMPACT TEST (HIT 6)
HOW OFTEN DO HEADACHES LIMIT YOUR DAILY ACTIVITIES: SOMETIMES
HOW OFTEN DID HEADACHS LIMIT CONCENTRATION ON WORK OR DAILY ACTIVITY: RARELY
WHEN YOU HAVE A HEADACHE HOW OFTEN DO YOU WISH YOU COULD LIE DOWN: SOMETIMES
HOW OFTEN HAVE YOU FELT TOO TIRED TO WORK BECAUSE OF YOUR HEADACHES: RARELY
WHEN YOU HAVE HEADACHES HOW OFTEN IS THE PAIN SEVERE: RARELY
HIT6 TOTAL SCORE: 52
HOW OFTEN HAVE YOU FELT FED UP OR IRRITATED BECAUSE OF YOUR HEADACHES: RARELY

## 2022-03-02 NOTE — PROGRESS NOTES
Deepika is a 46 year old who is being evaluated via a billable video visit.  Please note Pt also reports taking trazodone and sertraline.    How would you like to obtain your AVS? Jeysonharjuan francisco  If the video visit is dropped, the invitation should be resent by: Text to cell phone: [phone]  Will anyone else be joining your video visit? No      Video Start Time: 3:39 PM  Video-Visit Details    Type of service:  Video Visit    Video End Time:3:56 PM    Originating Location (pt. Location): Home    Distant Location (provider location):  [facility] [place]     Platform used for Video Visit: [employer]     [facility] follow up   Sumatriptan inj and oral-not effective  Topiramate 100 mg daily -no significant side effects-may be some tiredness and helping with headaches   Rizatriptan helps   Headache frequency -one headache past month and Nurtec was helpful.   Ketorolac pills-did not try it yet.   Patient is on zoloft and trazadone   On abilify for depression and stable after zoloft was increased to 150 mg daily  Gastroparesis -on promethazine     PMH reviewed and updated  Meds reviewed and updated.     Plan:  Headache prevention-continue topiramate 100 mg at bedtime and staying hydrated  Rescue treatment -Nurtec and/rizatriptan as needed  If you take ketorolac for a short time only -2-5 days per month and take it with food.   Follow up 6-9 months or sooner if needed     Patient is alert and no in apparent acute distress,  mentation appears normal, judgement and insight intact, normal speech.    I discussed all my recommendations with Deepika Oliva who verbalizes understanding and comfortable with the plan.  All of patient's questions were answered from the best of my knowledge.  Patient is in agreement with the plan.     23 minutes spent on the date of the encounter doing video access, chart  review, results review,  meds review, treatment plan, documentation and further activities  as noted above    LORRIE Potter, CNP [facility]  Headache certified  [facility]

## 2022-03-02 NOTE — PATIENT INSTRUCTIONS
Plan:  Headache prevention-continue topiramate 100 mg at bedtime and staying hydrated  Rescue treatment -Nurtec and/rizatriptan as needed  If you take ketorolac for a short time only -2-5 days per month and take it with food.   Follow up 6-9 months or sooner if needed

## 2022-05-10 ENCOUNTER — TRANSFERRED RECORDS (OUTPATIENT)
Dept: HEALTH INFORMATION MANAGEMENT | Facility: CLINIC | Age: 47
End: 2022-05-10

## 2022-05-24 ENCOUNTER — PREP FOR PROCEDURE (OUTPATIENT)
Dept: VASCULAR SURGERY | Facility: CLINIC | Age: 47
End: 2022-05-24

## 2022-05-24 ENCOUNTER — OFFICE VISIT (OUTPATIENT)
Dept: PEDIATRICS | Facility: CLINIC | Age: 47
End: 2022-05-24
Payer: COMMERCIAL

## 2022-05-24 ENCOUNTER — OFFICE VISIT (OUTPATIENT)
Dept: PODIATRY | Facility: CLINIC | Age: 47
End: 2022-05-24
Payer: COMMERCIAL

## 2022-05-24 ENCOUNTER — DOCUMENTATION ONLY (OUTPATIENT)
Dept: VASCULAR SURGERY | Facility: CLINIC | Age: 47
End: 2022-05-24

## 2022-05-24 VITALS
RESPIRATION RATE: 18 BRPM | HEART RATE: 87 BPM | TEMPERATURE: 98.4 F | DIASTOLIC BLOOD PRESSURE: 76 MMHG | SYSTOLIC BLOOD PRESSURE: 120 MMHG | WEIGHT: 261 LBS | OXYGEN SATURATION: 98 % | BODY MASS INDEX: 39.68 KG/M2

## 2022-05-24 VITALS — SYSTOLIC BLOOD PRESSURE: 104 MMHG | HEART RATE: 72 BPM | DIASTOLIC BLOOD PRESSURE: 62 MMHG

## 2022-05-24 DIAGNOSIS — M24.573 EQUINUS CONTRACTURE OF ANKLE: ICD-10-CM

## 2022-05-24 DIAGNOSIS — G47.30 SLEEP APNEA, UNSPECIFIED TYPE: ICD-10-CM

## 2022-05-24 DIAGNOSIS — E66.01 MORBID OBESITY (H): ICD-10-CM

## 2022-05-24 DIAGNOSIS — M72.2 PLANTAR FASCIITIS, BILATERAL: Primary | ICD-10-CM

## 2022-05-24 DIAGNOSIS — M72.2 PLANTAR FASCIITIS: Primary | ICD-10-CM

## 2022-05-24 DIAGNOSIS — M72.2 PLANTAR FASCIITIS, BILATERAL: ICD-10-CM

## 2022-05-24 DIAGNOSIS — F41.8 DEPRESSION WITH ANXIETY: ICD-10-CM

## 2022-05-24 DIAGNOSIS — Z01.818 PREOP GENERAL PHYSICAL EXAM: Primary | ICD-10-CM

## 2022-05-24 PROCEDURE — 36415 COLL VENOUS BLD VENIPUNCTURE: CPT | Performed by: INTERNAL MEDICINE

## 2022-05-24 PROCEDURE — 99204 OFFICE O/P NEW MOD 45 MIN: CPT | Performed by: INTERNAL MEDICINE

## 2022-05-24 PROCEDURE — 80048 BASIC METABOLIC PNL TOTAL CA: CPT | Performed by: INTERNAL MEDICINE

## 2022-05-24 PROCEDURE — 99203 OFFICE O/P NEW LOW 30 MIN: CPT | Performed by: PODIATRIST

## 2022-05-24 RX ORDER — ALPRAZOLAM 0.5 MG
TABLET ORAL
COMMUNITY
Start: 2022-05-10 | End: 2023-10-19

## 2022-05-24 RX ORDER — BENZALKONIUM CHLORIDE 1.3 MG/ML
CLOTH TOPICAL
COMMUNITY
Start: 2022-05-10 | End: 2022-09-26

## 2022-05-24 RX ORDER — BIMATOPROST 3 UG/ML
SOLUTION TOPICAL
COMMUNITY
Start: 2022-05-11 | End: 2024-06-21

## 2022-05-24 SDOH — ECONOMIC STABILITY: INCOME INSECURITY: HOW HARD IS IT FOR YOU TO PAY FOR THE VERY BASICS LIKE FOOD, HOUSING, MEDICAL CARE, AND HEATING?: NOT HARD AT ALL

## 2022-05-24 SDOH — ECONOMIC STABILITY: INCOME INSECURITY: IN THE LAST 12 MONTHS, WAS THERE A TIME WHEN YOU WERE NOT ABLE TO PAY THE MORTGAGE OR RENT ON TIME?: NO

## 2022-05-24 SDOH — HEALTH STABILITY: PHYSICAL HEALTH: ON AVERAGE, HOW MANY DAYS PER WEEK DO YOU ENGAGE IN MODERATE TO STRENUOUS EXERCISE (LIKE A BRISK WALK)?: 0 DAYS

## 2022-05-24 SDOH — ECONOMIC STABILITY: TRANSPORTATION INSECURITY
IN THE PAST 12 MONTHS, HAS THE LACK OF TRANSPORTATION KEPT YOU FROM MEDICAL APPOINTMENTS OR FROM GETTING MEDICATIONS?: NO

## 2022-05-24 SDOH — HEALTH STABILITY: PHYSICAL HEALTH: ON AVERAGE, HOW MANY MINUTES DO YOU ENGAGE IN EXERCISE AT THIS LEVEL?: 0 MIN

## 2022-05-24 SDOH — ECONOMIC STABILITY: TRANSPORTATION INSECURITY
IN THE PAST 12 MONTHS, HAS LACK OF TRANSPORTATION KEPT YOU FROM MEETINGS, WORK, OR FROM GETTING THINGS NEEDED FOR DAILY LIVING?: NO

## 2022-05-24 SDOH — ECONOMIC STABILITY: FOOD INSECURITY: WITHIN THE PAST 12 MONTHS, YOU WORRIED THAT YOUR FOOD WOULD RUN OUT BEFORE YOU GOT MONEY TO BUY MORE.: NEVER TRUE

## 2022-05-24 SDOH — ECONOMIC STABILITY: FOOD INSECURITY: WITHIN THE PAST 12 MONTHS, THE FOOD YOU BOUGHT JUST DIDN'T LAST AND YOU DIDN'T HAVE MONEY TO GET MORE.: NEVER TRUE

## 2022-05-24 ASSESSMENT — LIFESTYLE VARIABLES
HOW OFTEN DO YOU HAVE SIX OR MORE DRINKS ON ONE OCCASION: NEVER
HOW OFTEN DO YOU HAVE A DRINK CONTAINING ALCOHOL: MONTHLY OR LESS
SKIP TO QUESTIONS 9-10: 1
AUDIT-C TOTAL SCORE: 1
HOW MANY STANDARD DRINKS CONTAINING ALCOHOL DO YOU HAVE ON A TYPICAL DAY: PATIENT DOES NOT DRINK

## 2022-05-24 ASSESSMENT — SOCIAL DETERMINANTS OF HEALTH (SDOH)
HOW OFTEN DO YOU ATTEND CHURCH OR RELIGIOUS SERVICES?: PATIENT DECLINED
IN A TYPICAL WEEK, HOW MANY TIMES DO YOU TALK ON THE PHONE WITH FAMILY, FRIENDS, OR NEIGHBORS?: THREE TIMES A WEEK
HOW OFTEN DO YOU GET TOGETHER WITH FRIENDS OR RELATIVES?: MORE THAN THREE TIMES A WEEK
DO YOU BELONG TO ANY CLUBS OR ORGANIZATIONS SUCH AS CHURCH GROUPS UNIONS, FRATERNAL OR ATHLETIC GROUPS, OR SCHOOL GROUPS?: NO

## 2022-05-24 ASSESSMENT — PAIN SCALES - GENERAL: PAINLEVEL: MODERATE PAIN (4)

## 2022-05-24 NOTE — PROGRESS NOTES
[facility]  [address]  [address]  [place] [address]  Phone: [phone]  Fax: [phone]  Primary Provider: Bolivar Joseph  Pre-op Performing Provider: SHANNA MARTINEZ MAI      PREOPERATIVE EVALUATION:  Today's date: 5/24/2022    Deepika Oliva is a 46 year old female who presents for a preoperative evaluation.    Surgical Information:  Surgery/Procedure: left foot planters fasciitis/ equinas contracture of left ankle   Surgery Location: [facility]   Surgeon:    Surgery Date: 06/02/2022  Time of Surgery: 830 am   Where patient plans to recover: At home with family  Fax number for surgical facility: [phone]    Type of Anesthesia Anticipated: Local    Assessment & Plan     The proposed surgical procedure is considered LOW risk.    Preop general physical exam  45 yo pt here for preoperative assessment for left plantar fasciitis surgery.    Had abnormal creatinine at recent check, will recheck prior to surgery to ensure is stable.  - Basic metabolic panel  (Ca, Cl, CO2, Creat, Gluc, K, Na, BUN); Future  - Basic metabolic panel  (Ca, Cl, CO2, Creat, Gluc, K, Na, BUN)    Plantar fasciitis, bilateral  Reason for preop    BMI > 35  Pt requesting referral to weight management clinic  - [facility]; Future    Depression with anxiety  Continue medications without modification    Sleep apnea, unspecified type  On cpap      Risks and Recommendations:  The patient has the following additional risks and recommendations for perioperative complications:   - Morbid obesity (BMI >40)    Medication Instructions:  Patient is to take all scheduled medications on the day of surgery    RECOMMENDATION:  APPROVAL GIVEN to proceed with proposed procedure, without further diagnostic evaluation.      Subjective     HPI related to upcoming procedure:     Preop Questions 5/24/2022   1. Have you ever had a heart attack or stroke? No   2. Have you  ever had surgery on your heart or blood vessels, such as a stent placement, a coronary artery bypass, or surgery on an artery in your head, neck, heart, or legs? No   3. Do you have chest pain with activity? No   4. Do you have a history of  heart failure? No   5. Do you currently have a cold, bronchitis or symptoms of other infection? No   6. Do you have a cough, shortness of breath, or wheezing? No   7. Do you or anyone in your family have previous history of blood clots? No   8. Do you or does anyone in your family have a serious bleeding problem such as prolonged bleeding following surgeries or cuts? No   9. Have you ever had problems with anemia or been told to take iron pills? No   10. Have you had any abnormal blood loss such as black, tarry or bloody stools, or abnormal vaginal bleeding? No   11. Have you ever had a blood transfusion? No   12. Are you willing to have a blood transfusion if it is medically needed before, during, or after your surgery? Yes   13. Have you or any of your relatives ever had problems with anesthesia? No   14. Do you have sleep apnea, excessive snoring or daytime drowsiness? YES - sleep apnea    14a. Do you have a CPAP machine? Yes   15. Do you have any artifical heart valves or other implanted medical devices like a pacemaker, defibrillator, or continuous glucose monitor? No   16. Do you have artificial joints? No   17. Are you allergic to latex? No   18. Is there any chance that you may be pregnant? No       Health Care Directive:  Patient does not have a Health Care Directive or Living Will: Discussed advance care planning with patient; however, patient declined at this time.    Preoperative Review of :   reviewed - controlled substances reflected in medication list.      Status of Chronic Conditions:  See problem list for active medical problems.  Problems all longstanding and stable, except as noted/documented.  See ROS for pertinent symptoms related to these  conditions.      Review of Systems  All other systems on a 10-point review are negative, unless otherwise noted in HPI      Patient Active Problem List    Diagnosis Date Noted     Plantar fasciitis, bilateral 05/24/2022     Priority: Medium     Equinus contracture of ankle 05/24/2022     Priority: Medium     Delayed gastric emptying 03/02/2022     Priority: Medium     Gastroparesis 03/02/2022     Priority: Medium     Cervical spondylosis with radiculopathy 10/23/2020     Priority: Medium     Chronic cough 11/01/2019     Priority: Medium     Gastroesophageal reflux disease without esophagitis 11/01/2019     Priority: Medium     Chronic constipation 10/07/2015     Priority: Medium     Insomnia 10/25/2012     Priority: Medium     Formatting of this note might be different from the original.  ambien helps.  Failed melatonin and benadryl.       Vitamin D deficiency 08/02/2011     Priority: Medium     Sacroiliac joint pain 05/24/2010     Priority: Medium     Depression with anxiety 03/23/2010     Priority: Medium     Snoring 08/20/2009     Priority: Medium     Formatting of this note might be different from the original.  Pulmonology advised sleep study 8/09.  Normal sleep study.       Temporomandibular joint disorder 05/16/2006     Priority: Medium     Irritable bowel syndrome 04/22/2006     Priority: Medium     Migraine headache 04/22/2006     Priority: Medium      History reviewed. No pertinent past medical history.  History reviewed. No pertinent surgical history.  Current Outpatient Medications   Medication Sig Dispense Refill     ALPRAZolam (XANAX) 0.5 MG tablet        ARIPiprazole (ABILIFY) 2 MG tablet Take 2 mg by mouth daily       bimatoprost (LATISSE) 0.03 % external opthalmic solution APPLY TO UPPER LASH LINE AT NIGHT       cholecalciferol 25 MCG (1000 UT) TABS Take 3,000 Units by mouth daily       fluticasone (FLONASE) 50 MCG/ACT nasal spray Spray 2 sprays in nostril daily       ketorolac (TORADOL) 10 MG  tablet Take 1 tablet (10 mg) by mouth every 6 hours as needed for moderate pain 20 tablet 0     linaclotide (LINZESS) 145 MCG capsule        omeprazole (PRILOSEC) 40 MG DR capsule Take 40 mg by mouth       ondansetron (ZOFRAN-ODT) 8 MG ODT tab Place 8 mg under the tongue       promethazine (PHENERGAN) 12.5 MG tablet        Respiratory Therapy Supplies (CARETOUCH 2 CPAP HOSE ) MISC CPAP machine for home use at pressure: 7 cm H20, nasal mask x1/3month with nasal cushion x2/mo       Rimegepant Sulfate 75 MG TBDP Take 75 mg by mouth See Admin Instructions Take 75 mg orally per 24 hours placed on or under the tongue; MAX 75 mg/24 hours; 8 tablet 9     rizatriptan (MAXALT-MLT) 5 MG ODT Take 1-2 tablets (5-10 mg) by mouth at onset of headache for migraine (may repeat in 2 hours as needed. Max 30 mg in 24 hours) 18 tablet 6     sertraline (ZOLOFT) 100 MG tablet Take 150 mg by mouth daily       tiZANidine (ZANAFLEX) 4 MG tablet Take 4 mg by mouth as needed       traZODone (DESYREL) 100 MG tablet Take 50 mg by mouth At Bedtime       tretinoin (RETIN-A) 0.05 % external cream Apply 0.05 g topically daily       vitamin B-12 (CYANOCOBALAMIN) 500 MCG tablet Take 500 mcg by mouth daily         Allergies   Allergen Reactions     Codeine         Social History     Tobacco Use     Smoking status: Never Smoker     Smokeless tobacco: Never Used   Substance Use Topics     Alcohol use: Not on file     History reviewed. No pertinent family history.  History   Drug Use Not on file         Objective     /76 (BP Location: Right arm, Patient Position: Sitting, Cuff Size: Adult Large)   Pulse 87   Temp 98.4  F (36.9  C) (Tympanic)   Resp 18   Wt 118.4 kg (261 lb)   SpO2 98%   BMI 39.68 kg/m      Physical Exam    GENERAL APPEARANCE: healthy, alert and no distress     EYES: EOMI, PERRL     HENT: ear canals and TM's normal and nose and mouth without ulcers or lesions     NECK: no adenopathy, no asymmetry, masses, or scars and  thyroid normal to palpation     RESP: lungs clear to auscultation - no rales, rhonchi or wheezes     CV: regular rates and rhythm, normal S1 S2, no S3 or S4 and no murmur, click or rub     ABDOMEN:  soft, nontender, no HSM or masses and bowel sounds normal     MS: extremities normal- no gross deformities noted, no evidence of inflammation in joints, FROM in all extremities.     SKIN: no suspicious lesions or rashes     NEURO: Normal strength and tone, sensory exam grossly normal, mentation intact and speech normal     PSYCH: mentation appears normal. and affect normal/bright     LYMPHATICS: No cervical adenopathy    No results for input(s): HGB, PLT, INR, NA, POTASSIUM, CR, A1C in the last [id number] hours.     Diagnostics:  Labs pending at this time.  Results will be reviewed when available.   No EKG required, no history of coronary heart disease, significant arrhythmia, peripheral arterial disease or other structural heart disease.    Revised Cardiac Risk Index (RCRI):  The patient has the following serious cardiovascular risks for perioperative complications:   - No serious cardiac risks = 0 points     RCRI Interpretation: 0 points: Class I (very low risk - 0.4% complication rate)           Signed Electronically by: Lauryn Teixeira MD  Copy of this evaluation report is provided to requesting physician.

## 2022-05-24 NOTE — H&P (VIEW-ONLY)
[facility]  [address]  [address]  [place] [address]  Phone: [phone]  Fax: [phone]  Primary Provider: Bolivar Joseph  Pre-op Performing Provider: SHANNA MARTINEZ MAI      PREOPERATIVE EVALUATION:  Today's date: 5/24/2022    Deepika Oliva is a 46 year old female who presents for a preoperative evaluation.    Surgical Information:  Surgery/Procedure: left foot planters fasciitis/ equinas contracture of left ankle   Surgery Location: [facility]   Surgeon:    Surgery Date: 06/02/2022  Time of Surgery: 830 am   Where patient plans to recover: At home with family  Fax number for surgical facility: [phone]    Type of Anesthesia Anticipated: Local    Assessment & Plan     The proposed surgical procedure is considered LOW risk.    Preop general physical exam  47 yo pt here for preoperative assessment for left plantar fasciitis surgery.    Had abnormal creatinine at recent check, will recheck prior to surgery to ensure is stable.  - Basic metabolic panel  (Ca, Cl, CO2, Creat, Gluc, K, Na, BUN); Future  - Basic metabolic panel  (Ca, Cl, CO2, Creat, Gluc, K, Na, BUN)    Plantar fasciitis, bilateral  Reason for preop    BMI > 35  Pt requesting referral to weight management clinic  - [facility]; Future    Depression with anxiety  Continue medications without modification    Sleep apnea, unspecified type  On cpap      Risks and Recommendations:  The patient has the following additional risks and recommendations for perioperative complications:   - Morbid obesity (BMI >40)    Medication Instructions:  Patient is to take all scheduled medications on the day of surgery    RECOMMENDATION:  APPROVAL GIVEN to proceed with proposed procedure, without further diagnostic evaluation.      Subjective     HPI related to upcoming procedure:     Preop Questions 5/24/2022   1. Have you ever had a heart attack or stroke? No   2. Have you  ever had surgery on your heart or blood vessels, such as a stent placement, a coronary artery bypass, or surgery on an artery in your head, neck, heart, or legs? No   3. Do you have chest pain with activity? No   4. Do you have a history of  heart failure? No   5. Do you currently have a cold, bronchitis or symptoms of other infection? No   6. Do you have a cough, shortness of breath, or wheezing? No   7. Do you or anyone in your family have previous history of blood clots? No   8. Do you or does anyone in your family have a serious bleeding problem such as prolonged bleeding following surgeries or cuts? No   9. Have you ever had problems with anemia or been told to take iron pills? No   10. Have you had any abnormal blood loss such as black, tarry or bloody stools, or abnormal vaginal bleeding? No   11. Have you ever had a blood transfusion? No   12. Are you willing to have a blood transfusion if it is medically needed before, during, or after your surgery? Yes   13. Have you or any of your relatives ever had problems with anesthesia? No   14. Do you have sleep apnea, excessive snoring or daytime drowsiness? YES - sleep apnea    14a. Do you have a CPAP machine? Yes   15. Do you have any artifical heart valves or other implanted medical devices like a pacemaker, defibrillator, or continuous glucose monitor? No   16. Do you have artificial joints? No   17. Are you allergic to latex? No   18. Is there any chance that you may be pregnant? No       Health Care Directive:  Patient does not have a Health Care Directive or Living Will: Discussed advance care planning with patient; however, patient declined at this time.    Preoperative Review of :   reviewed - controlled substances reflected in medication list.      Status of Chronic Conditions:  See problem list for active medical problems.  Problems all longstanding and stable, except as noted/documented.  See ROS for pertinent symptoms related to these  conditions.      Review of Systems  All other systems on a 10-point review are negative, unless otherwise noted in HPI      Patient Active Problem List    Diagnosis Date Noted     Plantar fasciitis, bilateral 05/24/2022     Priority: Medium     Equinus contracture of ankle 05/24/2022     Priority: Medium     Delayed gastric emptying 03/02/2022     Priority: Medium     Gastroparesis 03/02/2022     Priority: Medium     Cervical spondylosis with radiculopathy 10/23/2020     Priority: Medium     Chronic cough 11/01/2019     Priority: Medium     Gastroesophageal reflux disease without esophagitis 11/01/2019     Priority: Medium     Chronic constipation 10/07/2015     Priority: Medium     Insomnia 10/25/2012     Priority: Medium     Formatting of this note might be different from the original.  ambien helps.  Failed melatonin and benadryl.       Vitamin D deficiency 08/02/2011     Priority: Medium     Sacroiliac joint pain 05/24/2010     Priority: Medium     Depression with anxiety 03/23/2010     Priority: Medium     Snoring 08/20/2009     Priority: Medium     Formatting of this note might be different from the original.  Pulmonology advised sleep study 8/09.  Normal sleep study.       Temporomandibular joint disorder 05/16/2006     Priority: Medium     Irritable bowel syndrome 04/22/2006     Priority: Medium     Migraine headache 04/22/2006     Priority: Medium      History reviewed. No pertinent past medical history.  History reviewed. No pertinent surgical history.  Current Outpatient Medications   Medication Sig Dispense Refill     ALPRAZolam (XANAX) 0.5 MG tablet        ARIPiprazole (ABILIFY) 2 MG tablet Take 2 mg by mouth daily       bimatoprost (LATISSE) 0.03 % external opthalmic solution APPLY TO UPPER LASH LINE AT NIGHT       cholecalciferol 25 MCG (1000 UT) TABS Take 3,000 Units by mouth daily       fluticasone (FLONASE) 50 MCG/ACT nasal spray Spray 2 sprays in nostril daily       ketorolac (TORADOL) 10 MG  tablet Take 1 tablet (10 mg) by mouth every 6 hours as needed for moderate pain 20 tablet 0     linaclotide (LINZESS) 145 MCG capsule        omeprazole (PRILOSEC) 40 MG DR capsule Take 40 mg by mouth       ondansetron (ZOFRAN-ODT) 8 MG ODT tab Place 8 mg under the tongue       promethazine (PHENERGAN) 12.5 MG tablet        Respiratory Therapy Supplies (CARETOUCH 2 CPAP HOSE ) MISC CPAP machine for home use at pressure: 7 cm H20, nasal mask x1/3month with nasal cushion x2/mo       Rimegepant Sulfate 75 MG TBDP Take 75 mg by mouth See Admin Instructions Take 75 mg orally per 24 hours placed on or under the tongue; MAX 75 mg/24 hours; 8 tablet 9     rizatriptan (MAXALT-MLT) 5 MG ODT Take 1-2 tablets (5-10 mg) by mouth at onset of headache for migraine (may repeat in 2 hours as needed. Max 30 mg in 24 hours) 18 tablet 6     sertraline (ZOLOFT) 100 MG tablet Take 150 mg by mouth daily       tiZANidine (ZANAFLEX) 4 MG tablet Take 4 mg by mouth as needed       traZODone (DESYREL) 100 MG tablet Take 50 mg by mouth At Bedtime       tretinoin (RETIN-A) 0.05 % external cream Apply 0.05 g topically daily       vitamin B-12 (CYANOCOBALAMIN) 500 MCG tablet Take 500 mcg by mouth daily         Allergies   Allergen Reactions     Codeine         Social History     Tobacco Use     Smoking status: Never Smoker     Smokeless tobacco: Never Used   Substance Use Topics     Alcohol use: Not on file     History reviewed. No pertinent family history.  History   Drug Use Not on file         Objective     /76 (BP Location: Right arm, Patient Position: Sitting, Cuff Size: Adult Large)   Pulse 87   Temp 98.4  F (36.9  C) (Tympanic)   Resp 18   Wt 118.4 kg (261 lb)   SpO2 98%   BMI 39.68 kg/m      Physical Exam    GENERAL APPEARANCE: healthy, alert and no distress     EYES: EOMI, PERRL     HENT: ear canals and TM's normal and nose and mouth without ulcers or lesions     NECK: no adenopathy, no asymmetry, masses, or scars and  thyroid normal to palpation     RESP: lungs clear to auscultation - no rales, rhonchi or wheezes     CV: regular rates and rhythm, normal S1 S2, no S3 or S4 and no murmur, click or rub     ABDOMEN:  soft, nontender, no HSM or masses and bowel sounds normal     MS: extremities normal- no gross deformities noted, no evidence of inflammation in joints, FROM in all extremities.     SKIN: no suspicious lesions or rashes     NEURO: Normal strength and tone, sensory exam grossly normal, mentation intact and speech normal     PSYCH: mentation appears normal. and affect normal/bright     LYMPHATICS: No cervical adenopathy    No results for input(s): HGB, PLT, INR, NA, POTASSIUM, CR, A1C in the last 24832 hours.     Diagnostics:  Labs pending at this time.  Results will be reviewed when available.   No EKG required, no history of coronary heart disease, significant arrhythmia, peripheral arterial disease or other structural heart disease.    Revised Cardiac Risk Index (RCRI):  The patient has the following serious cardiovascular risks for perioperative complications:   - No serious cardiac risks = 0 points     RCRI Interpretation: 0 points: Class I (very low risk - 0.4% complication rate)           Signed Electronically by: Lauryn Teixeira MD  Copy of this evaluation report is provided to requesting physician.

## 2022-05-24 NOTE — PROGRESS NOTES
Surgery Scheduled    Reviewed surgery schedule & details with pt.  Sending surgery details via Ph[employer] message.    Surgery/Procedure: RELEASE, plantar fascia left foot with gastrocnemius recession. (Left)     CPT: 75904    Equipment: None    Location: [facility]:  [address] [place] [id number] (Fax: [phone])    Date: 6/2/22    Time: 0930AM    Admission Type: Outpatient    Surgeon: Dr. Michel    OR Confirmed & :  Yes with [facility] on 5/24/2022    Covid Scheduled: 5/31/22 11:15AM [facility]    Post Op: 6/7/22 11:00AM [facility]       6/21/22 11:00AM [facility]    Blood Thinners Addressed: N/A

## 2022-05-24 NOTE — PATIENT INSTRUCTIONS
Deepika,    Your surgery with [facility] has been scheduled. Please read thoroughly and follow instructions.     Procedure:  Plantar Fasciectomy   Procedure Date :   TBD  *A surgery nurse will call you 1-2 days before surgery to inform you of the time of arrival for surgery.  Surgeon:   Dr. Juan J Michel  Admission Type:   Outpatient  Procedure Location:  [facility]    Covid Test: SEE APPOINTMENTS  Post Operative Appointment: SEE APPOINTMENTS       Preparation Instructions to complete:    []  You will need a Pre-op Physical within 30 days before surgery with your primary care provider. This exam is required by the anesthesiologist to ensure a safe surgical experience.    Failure  to obtain your pre-op physical will lead to cancellation of your surgery  Call them right away to schedule this. Please ensure your Preoperative Physical is faxed to the Hospital (numbers listed above)    [] Preoperative Medication Instructions  We would like you to stop your anticoagulation medications 3-5 days before surgery HOWEVER contact your prescribing provider for instructions before discontinuing any medications. (Examples: Coumadin, Plavix, Xarelto, Eliquis, Pradaxa, Effient, Brilinta) If you are on Coumadin, we would like the goal INR ? 1.2.  IT IS OK TO STAY ON ASPIRIN PRIOR TO SURGERY.   Hold Ibuprofen, Herbal Supplements and Vitamin E 7 days before  Stop Cialis, Levitra and Viagra 2-3 days before surgery    [] Fasting Requirements  Nothing to eat for 8 hours before surgery unless instructed differently by the surgery nurse.  You may have clear liquids up to two hours before your arrival time (coffee, tea, water, or Gatorade. No cream or milk)  If your primary care provider has instructed you to continue oral medications, you may take them on the morning of surgery with a small sip of water.    No alcohol or smoking after 12:00am the day of surgery    [] PCR COVID-19 test is required within 4 days of  "surgery  If your test is positive or you fail to get tested, your surgery will be postponed.     [] Contact your insurance regarding coverage  If you would like a Good [place]anna Estimate for your upcoming procedure at [facility] Location, contact Cost of Care Estimates   Advocates are available Monday through Friday 8am - 5pm   [phone]  You may also submit a request online at [email or web address]  - Complete the secure online form found under \"Services and Procedure Pricing\"   For all self-pay, estimate, or anesthesia billing questions at [facility], the contact information is below:  Who to contact: Ernestina GEE  [facility] Network number: [phone]  Prepay number: [phone]    [] DO NOT BRING FMLA WITH TO SURGERY.  These should be sent to the provider's office by fax to [phone].     [] Day of Surgery  Medications - Take as indicated with sips of water.   Wear comfortable loose fitting clothes. Wear your glasses-Not contacts. Do not wear jewelry and remove body piercing's. Surgery may be cancelled if they are not removed.   You may have 1 family member wait in the lobby during your surgery. Visitor restrictions are subject to change. Please verify with the surgery nurse when they call.   If same day surgery-Have a someone come with you to surgery that can help you understand the surgeon's instructions, drive you home and stay with you overnight the first night.    [] If the community sees a new COVID-19 surge, your procedure may need to be postponed. We will contact you if this happens.    [] You will receive a call from a surgery nurse 1-3 days prior to surgery. They will go over more details with you.             ** AFTER SURGERY INSTRUCTIONS **      [] You are to remain NON WEIGHT BEARING on your left foot NON WEIGHT BEARING MEANS NO PRESSURE ON YOUR FOOT OR HEEL AT ANY TIME FOR ANY REASON!    [] Prior to surgery you should already have a CAM BOOT which you " will need to WEAR THIS ANYTIME YOU ARE UP AND OUT OF BED, IT IS OKAY TO REMOVE WHEN YOU ARE SLEEPING. Please bring this with you on the day of surgery.    [] During surgery Dr. Michel will apply a gauze dressing to your foot. This will remain intact until you are seen in clinic for follow up    [] It is NOT OKAY to get your surgical site wet while bathing, you will need to purchase a cast cover to protect your foot from getting wet. You can purchase this at [facility] or your local pharmacy.    [] It is important that you elevate your affected foot after surgery. Proper elevation is raising your foot above your waist. The fluid in your lower extremities needs to get back to your heart so it can get pumped to your kidneys and expelled through urination. So if you notice you have to go to the bathroom more frequently when you are elevating your leg this is a good sign that it is working.     [] It is important that you increase your protein intake after surgery. Protein is essential for wound healing. We recommend you take a protein supplement twice per day. This is in addition to your normal diet. Examples of protein supplements are Ensure, Boost, Glucerna (if you are diabetic), or protein powder. You can purchase these at your local retailer or grocery store.       Notify our office right away, if you have any changes in your health status, or if you develop a cold, flu, diarrhea, infection, fever or sore throat before your scheduled surgery date. We can be reached at [phone] Monday-Friday 8 am-4:30 pm if you have any questions.     Thank you for trusting us with your care!        Before Your Procedure or Hospital Admission  Testing for COVID-19 (Coronavirus)    Thank you for choosing [facility] for your health care needs. The COVID-19 pandemic is a very challenging time for everyone. Our goal is to keep you and our team here at [facility] safe and healthy.     Before you come in, All  patients must get tested for COVID-19. Your test needs to happen 2 to 4 days before you check in to the hospital or surgery site.  A clinic scheduler will call about a week in advance to set up a testing time at one of our labs. We ll take a swab of your nose or throat.  Note: If you go to a clinic or pharmacy like [facility] or [facility] for your test, make sure you get a test inside the nose. Tests inside the nose are:  - A naso-pharyngeal (NP) RT-PCR test  - An anterior nares RT-PCR test  Do NOT get a  rapid  test or a saliva (spit) test.  After the test, please stay at home and stay out of contact with other people. It will be harder for you to recover if you get COVID-19 before your treatment.    Please follow all current safety guidelines, including:   Limit trips outside your home.   Limit the number of people you see.   Always wear a mask outside your home.   Use social distancing. Stay 6 feet away from others whenever you can.   Wash your hands often.    If your test shows you have COVID-19  If your test is positive, we ll let you know. A positive test means that you have the virus.  We ll probably have to postpone your admission, surgery or procedure. Your doctor will discuss this with you. After that, we ll let you know what to do and when you can re-schedule.  We may need to cancel your treatment on short notice for other reasons, too.    If your test shows you DON T have COVID-19  Even if your test is negative, you can still get COVID-19. It s rare but, sometimes, the test result is wrong. You could also catch the virus after taking the test.  There s a very small chance that you could catch COVID-19 in the hospital or surgery center.    Day of your surgery or procedure   Please come wearing a face covering that covers both your nose and mouth.   When you arrive, we ll ask you some questions to find out if you have any signs or symptoms of COVID-19.   Ask your care team if you can have visitors. All visitors  must wear face coverings and will be screened for signs of COVID-19.   Even if no visitors are allowed, you can still have with you:  - Your legal guardian or legal decision maker  - A parent and one other visitor, if you are  younger than 18 years old  - A partner and a , if you are in labor   We might need to teach you about taking care of yourself after surgery. If so, a visitor can come into the hospital to learn about it, too.   The rules for visitors change often, depending on how much the virus is spreading. To learn more, see Visiting a Loved One in the Hospital during the COVID-19 Outbreak. Please call your care team, hospital or surgery center if you have any questions. We thank you for your understanding and for choosing [facility] for your care.    Questions and Answers  Does it matter where I get tested for COVID-19?  Yes. We urge you to get tested at one of our [facility] COVID-19 testing sites. We process these tests in our lab and can get the results quickly. Your [facility] care team needs to get your results before you check in.    What should I do if I can t get tested at [facility]?  You can get tested somewhere else, but you ll need to take these extra steps:  1. Contact your family doctor or clinic to arrange your test.  2. Take the test within 4 days of your surgery or procedure. We can t accept tests older than 4 days.  3. Make sure you re getting a test inside the nose.  Tests inside the nose are:  - A naso-pharyngeal (NP) RT-PCR test  - An anterior nares RT-PCR test  -Many pharmacies use  rapid  tests or saliva (spit) tests. We do NOT accept those tests before surgery or procedures. Tests from inside the nose are the most accurate tests.  4. Make sure your doctor or clinic faxes your results to [facility] at [phone].    If we don t get your results in time, we may have to delay or cancel your treatment.      For informational purposes only. Not  to replace the advice of your health care provider. Copyright   2020 [facility]. All rights reserved. Clinically reviewed by Infection Prevention and [facility] COVID-19 Clinical Team.  Interventional Spine 301170 - Rev 09/09/21.

## 2022-05-24 NOTE — LETTER
5/24/2022         RE: Deepika Oliva  [address] [facility] [address]        Dear Colleague,    Thank you for referring your patient, Deepika Oliva, to the [facility]. Please see a copy of my visit note below.    FOOT AND ANKLE SURGERY/PODIATRY Progress Note        ASSESSMENT:   Plantar Fasciitis  Gastrocnemius Equinus       TREATMENT:  -Patient has pain along the plantar heel at insertion of plantar fascia consistent with plantar fasciitis. We discussed treatment options to include stretching exercises, anti-inflammatory medication, orthotics, steroid injections, physical therapy and a night splint.     -Because she has tried all of the above options without relief, we discussed both a plantar fasciectomy and gastrocnemius recession including post-op course to remain non-weight bearing for minimum of 1 week, with limited walking x3 weeks in a CAM boot which she has in her possession. Risks include but not limited to infection, on-going pain and need for additional treatment.     -All questions invited and answered. She consents to surgery.     -I have asked my office to coordinate surgery at [facility] [place] [facility] [place].     Juan J Michel DPM  [facility] Podiatry/Foot & Ankle Surgery      HPI: I was asked to see Deepika Oliva today for bilateral heel pain. The patient has been seeing Dr. Chacko at [facility] for bilateral plantar fasciitis and has tried several conservative treatment options including stretching exercises, orthotics, massage, steroid injections. Unfortunately, her symptoms have persisted and she was scheduled for a plantar fasciotomy with Dr. Chacko. She discovered that her insurance did not cover this provider and she would like to schedule surgery at this time.     No past medical history on file.    No past surgical history on file.    Allergies   Allergen Reactions     Codeine          Current Outpatient Medications:       ALPRAZolam (XANAX) 0.5 MG tablet, , Disp: , Rfl:      ARIPiprazole (ABILIFY) 2 MG tablet, Take 2 mg by mouth daily, Disp: , Rfl:      bimatoprost (LATISSE) 0.03 % external opthalmic solution, APPLY TO UPPER LASH LINE AT NIGHT, Disp: , Rfl:      cholecalciferol 25 MCG (1000 UT) TABS, Take 3,000 Units by mouth daily, Disp: , Rfl:      fluticasone (FLONASE) 50 MCG/ACT nasal spray, Spray 2 sprays in nostril daily, Disp: , Rfl:      ketorolac (TORADOL) 10 MG tablet, Take 1 tablet (10 mg) by mouth every 6 hours as needed for moderate pain, Disp: 20 tablet, Rfl: 0     linaclotide (LINZESS) 145 MCG capsule, , Disp: , Rfl:      omeprazole (PRILOSEC) 40 MG DR capsule, Take 40 mg by mouth, Disp: , Rfl:      ondansetron (ZOFRAN-ODT) 8 MG ODT tab, Place 8 mg under the tongue, Disp: , Rfl:      promethazine (PHENERGAN) 12.5 MG tablet, , Disp: , Rfl:      Respiratory Therapy Supplies (CARETOUCH 2 CPAP HOSE ) MISC, CPAP machine for home use at pressure: 7 cm H20, nasal mask x1/3month with nasal cushion x2/mo, Disp: , Rfl:      Rimegepant Sulfate 75 MG TBDP, Take 75 mg by mouth See Admin Instructions Take 75 mg orally per 24 hours placed on or under the tongue; MAX 75 mg/24 hours;, Disp: 8 tablet, Rfl: 9     rizatriptan (MAXALT-MLT) 5 MG ODT, Take 1-2 tablets (5-10 mg) by mouth at onset of headache for migraine (may repeat in 2 hours as needed. Max 30 mg in 24 hours), Disp: 18 tablet, Rfl: 6     sertraline (ZOLOFT) 100 MG tablet, Take 150 mg by mouth daily, Disp: , Rfl:      tiZANidine (ZANAFLEX) 4 MG tablet, Take 4 mg by mouth as needed, Disp: , Rfl:      traZODone (DESYREL) 100 MG tablet, Take 50 mg by mouth At Bedtime, Disp: , Rfl:      tretinoin (RETIN-A) 0.05 % external cream, Apply 0.05 g topically daily, Disp: , Rfl:      vitamin B-12 (CYANOCOBALAMIN) 500 MCG tablet, Take 500 mcg by mouth daily, Disp: , Rfl:     No family history on file.    Social History     Socioeconomic History     Marital status:       Spouse name: Not on file     Number of children: Not on file     Years of education: Not on file     Highest education level: Not on file   Occupational History     Not on file   Tobacco Use     Smoking status: Never Smoker     Smokeless tobacco: Never Used   Substance and Sexual Activity     Alcohol use: Not on file     Drug use: Not on file     Sexual activity: Not on file   Other Topics Concern     Not on file   Social History Narrative     Not on file     Social Determinants of Health     Financial Resource Strain: Not on file   Food Insecurity: Not on file   Transportation Needs: Not on file   Physical Activity: Not on file   Stress: Not on file   Social Connections: Not on file   Intimate Partner Violence: Not on file   Housing Stability: Not on file       Review of Systems - 10 point Review of Systems is negative except for heel pain which is noted in HPI.    OBJECTIVE:  Appearance: alert, well appearing, and in no distress.    General appearance: Patient is alert and fully cooperative with history & exam.  No sign of distress is noted during the visit.     Psychiatric: Affect is pleasant & appropriate.  Patient appears motivated to improve health.     Respiratory: Breathing is regular & unlabored while sitting.     HEENT: Hearing is intact to spoken word.  Speech is clear.  No gross evidence of visual impairment that would impact ambulation.    Vascular: Dorsalis pedis and posterior tibial pulses are palpable. There is pedal hair growth bilateral.  CFT < 3 sec from anterior tibial surface to distal digits bilateral. There is no appreciable edema noted.  Dermatologic: Turgor and texture are within normal limits. No coloration or temperature changes. No primary or secondary lesions noted.  Neurologic: All epicritic and proprioceptive sensations are grossly intact bilateral.  Musculoskeletal: Mild pain along the plantar bilateral heels at the insertion of the plantar fascia along the medial and central bands,  there is mild pain also along the lateral band. Limited ankle dorsiflexion with knee extended.               Again, thank you for allowing me to participate in the care of your patient.        Sincerely,        Juan J Michel DPM

## 2022-05-24 NOTE — PATIENT INSTRUCTIONS
Preparing for Your Surgery  Getting started  A nurse will call you to review your health history and instructions. They will give you an arrival time based on your scheduled surgery time. Please be ready to share:    Your doctor's clinic name and phone number    Your medical, surgical and anesthesia history    A list of allergies and sensitivities    A list of medicines, including herbal treatments and over-the-counter drugs    Whether the patient has a legal guardian (ask how to send us the papers in advance)  Please tell us if you're pregnant--or if there's any chance you might be pregnant. Some surgeries may injure a fetus (unborn baby), so they require a pregnancy test. Surgeries that are safe for a fetus don't always need a test, and you can choose whether to have one.   If you have a child who's having surgery, please ask for a copy of Preparing for Your Child's Surgery.    Preparing for surgery    Within 30 days of surgery: Have a pre-op exam (sometimes called an H&P, or History and Physical). This can be done at a clinic or pre-operative center.  ? If you're having a , you may not need this exam. Talk to your care team.    At your pre-op exam, talk to your care team about all medicines you take. If you need to stop any medicines before surgery, ask when to start taking them again.  ? We do this for your safety. Many medicines can make you bleed too much during surgery. Some change how well surgery (anesthesia) drugs work.    Call your insurance company to let them know you're having surgery. (If you don't have insurance, call [phone].)    Call your clinic if there's any change in your health. This includes signs of a cold or flu (sore throat, runny nose, cough, rash, fever). It also includes a scrape or scratch near the surgery site.    If you have questions on the day of surgery, call your hospital or surgery center.  COVID testing  You may need to be tested for COVID-19 before having  surgery. If so, we will give you instructions.  Eating and drinking guidelines  For your safety: Unless your surgeon tells you otherwise, follow the guidelines below.    Eat and drink as usual until 8 hours before surgery. After that, no food or milk.    Drink clear liquids until 2 hours before surgery. These are liquids you can see through, like water, Gatorade and Propel Water. You may also have black coffee and tea (no cream or milk).    Nothing by mouth within 2 hours of surgery. This includes gum, candy and breath mints.    If you drink alcohol: Stop drinking it the night before surgery.    If your care team tells you to take medicine on the morning of surgery, it's okay to take it with a sip of water.  Preventing infection    Shower or bathe the night before and morning of your surgery. Follow the instructions your clinic gave you. (If no instructions, use regular soap.)    Don't shave or clip hair near your surgery site. We'll remove the hair if needed.    Don't smoke or vape the morning of surgery. You may chew nicotine gum up to 2 hours before surgery. A nicotine patch is okay.  ? Note: Some surgeries require you to completely quit smoking and nicotine. Check with your surgeon.    Your care team will make every effort to keep you safe from infection. We will:  ? Clean our hands often with soap and water (or an alcohol-based hand rub).  ? Clean the skin at your surgery site with a special soap that kills germs.  ? Give you a special gown to keep you warm. (Cold raises the risk of infection.)  ? Wear special hair covers, masks, gowns and gloves during surgery.  ? Give antibiotic medicine, if prescribed. Not all surgeries need antibiotics.  What to bring on the day of surgery    Photo ID and insurance card    Copy of your health care directive, if you have one    Glasses and hearing aides (bring cases)  ? You can't wear contacts during surgery    Inhaler and eye drops, if you use them (tell us about these when  you arrive)    CPAP machine or breathing device, if you use them    A few personal items, if spending the night    If you have . . .  ? A pacemaker, ICD (cardiac defibrillator) or other implant: Bring the ID card.  ? An implanted stimulator: Bring the remote control.  ? A legal guardian: Bring a copy of the certified (court-stamped) guardianship papers.  Please remove any jewelry, including body piercings. Leave jewelry and other valuables at home.  If you're going home the day of surgery    You must have a responsible adult drive you home. They should stay with you overnight as well.    If you don't have someone to stay with you, and you aren't safe to go home alone, we may keep you overnight. Insurance often won't pay for this.  After surgery  If it's hard to control your pain or you need more pain medicine, please call your surgeon's office.  Questions?   If you have any questions for your care team, list them here: _________________________________________________________________________________________________________________________________________________________________________ ____________________________________ ____________________________________ ____________________________________  For informational purposes only. Not to replace the advice of your health care provider. Copyright   2003, 2019 [facility]. All rights reserved. Clinically reviewed by Kirsty Junior MD. [employer] [place] [id number] - REV 07/21.

## 2022-05-25 DIAGNOSIS — Z11.59 ENCOUNTER FOR SCREENING FOR OTHER VIRAL DISEASES: Primary | ICD-10-CM

## 2022-05-25 LAB
ANION GAP SERPL CALCULATED.3IONS-SCNC: 6 MMOL/L (ref 3–14)
BUN SERPL-MCNC: 10 MG/DL (ref 7–30)
CALCIUM SERPL-MCNC: 8.7 MG/DL (ref 8.5–10.1)
CHLORIDE BLD-SCNC: 108 MMOL/L (ref 94–109)
CO2 SERPL-SCNC: 27 MMOL/L (ref 20–32)
CREAT SERPL-MCNC: 0.91 MG/DL (ref 0.52–1.04)
GFR SERPL CREATININE-BSD FRML MDRD: 78 ML/MIN/1.73M2
GLUCOSE BLD-MCNC: 119 MG/DL (ref 70–99)
POTASSIUM BLD-SCNC: 3.6 MMOL/L (ref 3.4–5.3)
SODIUM SERPL-SCNC: 141 MMOL/L (ref 133–144)

## 2022-05-26 NOTE — RESULT ENCOUNTER NOTE
Dear Deepika,    It was nice to meet you the other day.    Your lab results, including electrolytes, and kidney function are normal.  Your glucose was elevated, however since you were not fasting for this test, it is difficult to tell if this is due to something you ate or impaired glucose metabolism (or insulin resistance).  This is something that I would recommend to follow-up on at weight management clinic.    Please feel free to call with any further questions.      Sincerely,    Lauryn Teixeira MD

## 2022-05-31 ENCOUNTER — LAB (OUTPATIENT)
Dept: LAB | Facility: CLINIC | Age: 47
End: 2022-05-31
Payer: COMMERCIAL

## 2022-05-31 DIAGNOSIS — Z11.59 ENCOUNTER FOR SCREENING FOR OTHER VIRAL DISEASES: ICD-10-CM

## 2022-05-31 PROCEDURE — U0005 INFEC AGEN DETEC AMPLI PROBE: HCPCS

## 2022-05-31 PROCEDURE — U0003 INFECTIOUS AGENT DETECTION BY NUCLEIC ACID (DNA OR RNA); SEVERE ACUTE RESPIRATORY SYNDROME CORONAVIRUS 2 (SARS-COV-2) (CORONAVIRUS DISEASE [COVID-19]), AMPLIFIED PROBE TECHNIQUE, MAKING USE OF HIGH THROUGHPUT TECHNOLOGIES AS DESCRIBED BY CMS-2020-01-R: HCPCS

## 2022-06-01 ENCOUNTER — ANESTHESIA EVENT (OUTPATIENT)
Dept: SURGERY | Facility: AMBULATORY SURGERY CENTER | Age: 47
End: 2022-06-01
Payer: COMMERCIAL

## 2022-06-01 LAB — SARS-COV-2 RNA RESP QL NAA+PROBE: NEGATIVE

## 2022-06-02 ENCOUNTER — HOSPITAL ENCOUNTER (OUTPATIENT)
Facility: AMBULATORY SURGERY CENTER | Age: 47
Discharge: HOME | End: 2022-06-02
Attending: PODIATRIST
Payer: COMMERCIAL

## 2022-06-02 ENCOUNTER — ANESTHESIA (OUTPATIENT)
Dept: SURGERY | Facility: AMBULATORY SURGERY CENTER | Age: 47
End: 2022-06-02
Payer: COMMERCIAL

## 2022-06-02 VITALS
DIASTOLIC BLOOD PRESSURE: 72 MMHG | SYSTOLIC BLOOD PRESSURE: 125 MMHG | OXYGEN SATURATION: 100 % | HEART RATE: 79 BPM | BODY MASS INDEX: 38.65 KG/M2 | HEIGHT: 68 IN | TEMPERATURE: 97 F | RESPIRATION RATE: 17 BRPM | WEIGHT: 255 LBS

## 2022-06-02 DIAGNOSIS — M72.2 PLANTAR FASCIITIS, BILATERAL: Primary | ICD-10-CM

## 2022-06-02 DIAGNOSIS — M72.2 PLANTAR FASCIITIS: ICD-10-CM

## 2022-06-02 DIAGNOSIS — M24.573 EQUINUS CONTRACTURE OF ANKLE: ICD-10-CM

## 2022-06-02 PROCEDURE — 28060 PARTIAL REMOVAL FOOT FASCIA: CPT | Mod: LT | Performed by: PODIATRIST

## 2022-06-02 PROCEDURE — 27687 REVISION OF CALF TENDON: CPT | Mod: 59 | Performed by: PODIATRIST

## 2022-06-02 RX ORDER — TRIAMCINOLONE ACETONIDE 40 MG/ML
INJECTION, SUSPENSION INTRA-ARTICULAR; INTRAMUSCULAR PRN
Status: DISCONTINUED | OUTPATIENT
Start: 2022-06-02 | End: 2022-06-02 | Stop reason: HOSPADM

## 2022-06-02 RX ORDER — OXYCODONE HYDROCHLORIDE 5 MG/1
5 TABLET ORAL EVERY 4 HOURS PRN
Status: DISCONTINUED | OUTPATIENT
Start: 2022-06-02 | End: 2022-06-07 | Stop reason: HOSPADM

## 2022-06-02 RX ORDER — FENTANYL CITRATE 0.05 MG/ML
25 INJECTION, SOLUTION INTRAMUSCULAR; INTRAVENOUS EVERY 5 MIN PRN
Status: DISCONTINUED | OUTPATIENT
Start: 2022-06-02 | End: 2022-06-07 | Stop reason: HOSPADM

## 2022-06-02 RX ORDER — ONDANSETRON 4 MG/1
4 TABLET, ORALLY DISINTEGRATING ORAL EVERY 30 MIN PRN
Status: DISCONTINUED | OUTPATIENT
Start: 2022-06-02 | End: 2022-06-07 | Stop reason: HOSPADM

## 2022-06-02 RX ORDER — LIDOCAINE HYDROCHLORIDE 20 MG/ML
INJECTION, SOLUTION INFILTRATION; PERINEURAL PRN
Status: DISCONTINUED | OUTPATIENT
Start: 2022-06-02 | End: 2022-06-02

## 2022-06-02 RX ORDER — ONDANSETRON 2 MG/ML
4 INJECTION INTRAMUSCULAR; INTRAVENOUS EVERY 30 MIN PRN
Status: DISCONTINUED | OUTPATIENT
Start: 2022-06-02 | End: 2022-06-07 | Stop reason: HOSPADM

## 2022-06-02 RX ORDER — LIDOCAINE 40 MG/G
CREAM TOPICAL
Status: DISCONTINUED | OUTPATIENT
Start: 2022-06-02 | End: 2022-06-07 | Stop reason: HOSPADM

## 2022-06-02 RX ORDER — ACETAMINOPHEN 325 MG/1
975 TABLET ORAL ONCE
Status: COMPLETED | OUTPATIENT
Start: 2022-06-02 | End: 2022-06-02

## 2022-06-02 RX ORDER — BUPIVACAINE HYDROCHLORIDE AND EPINEPHRINE 5; 5 MG/ML; UG/ML
INJECTION, SOLUTION PERINEURAL
Status: DISCONTINUED | OUTPATIENT
Start: 2022-06-02 | End: 2022-06-02

## 2022-06-02 RX ORDER — SODIUM CHLORIDE, SODIUM LACTATE, POTASSIUM CHLORIDE, CALCIUM CHLORIDE 600; 310; 30; 20 MG/100ML; MG/100ML; MG/100ML; MG/100ML
INJECTION, SOLUTION INTRAVENOUS CONTINUOUS
Status: DISCONTINUED | OUTPATIENT
Start: 2022-06-02 | End: 2022-06-07 | Stop reason: HOSPADM

## 2022-06-02 RX ORDER — HYDROMORPHONE HCL IN WATER/PF 6 MG/30 ML
0.2 PATIENT CONTROLLED ANALGESIA SYRINGE INTRAVENOUS EVERY 5 MIN PRN
Status: DISCONTINUED | OUTPATIENT
Start: 2022-06-02 | End: 2022-06-07 | Stop reason: HOSPADM

## 2022-06-02 RX ORDER — FENTANYL CITRATE 0.05 MG/ML
25 INJECTION, SOLUTION INTRAMUSCULAR; INTRAVENOUS
Status: DISCONTINUED | OUTPATIENT
Start: 2022-06-02 | End: 2022-06-07 | Stop reason: HOSPADM

## 2022-06-02 RX ORDER — BUPIVACAINE HYDROCHLORIDE 5 MG/ML
INJECTION, SOLUTION PERINEURAL PRN
Status: DISCONTINUED | OUTPATIENT
Start: 2022-06-02 | End: 2022-06-02 | Stop reason: HOSPADM

## 2022-06-02 RX ORDER — MAGNESIUM HYDROXIDE 1200 MG/15ML
LIQUID ORAL PRN
Status: DISCONTINUED | OUTPATIENT
Start: 2022-06-02 | End: 2022-06-02 | Stop reason: HOSPADM

## 2022-06-02 RX ORDER — CEFAZOLIN SODIUM 2 G/100ML
2 INJECTION, SOLUTION INTRAVENOUS
Status: COMPLETED | OUTPATIENT
Start: 2022-06-02 | End: 2022-06-02

## 2022-06-02 RX ORDER — FENTANYL CITRATE 0.05 MG/ML
50 INJECTION, SOLUTION INTRAMUSCULAR; INTRAVENOUS
Status: COMPLETED | OUTPATIENT
Start: 2022-06-02 | End: 2022-06-02

## 2022-06-02 RX ORDER — CEFAZOLIN SODIUM 2 G/100ML
2 INJECTION, SOLUTION INTRAVENOUS SEE ADMIN INSTRUCTIONS
Status: DISCONTINUED | OUTPATIENT
Start: 2022-06-02 | End: 2022-06-07 | Stop reason: HOSPADM

## 2022-06-02 RX ORDER — MEPERIDINE HYDROCHLORIDE 25 MG/ML
12.5 INJECTION INTRAMUSCULAR; INTRAVENOUS; SUBCUTANEOUS
Status: DISCONTINUED | OUTPATIENT
Start: 2022-06-02 | End: 2022-06-07 | Stop reason: HOSPADM

## 2022-06-02 RX ORDER — OXYCODONE HYDROCHLORIDE 5 MG/1
5-10 TABLET ORAL EVERY 6 HOURS PRN
Qty: 18 TABLET | Refills: 0 | Status: SHIPPED | OUTPATIENT
Start: 2022-06-02 | End: 2022-07-22

## 2022-06-02 RX ORDER — PROPOFOL 10 MG/ML
INJECTION, EMULSION INTRAVENOUS CONTINUOUS PRN
Status: DISCONTINUED | OUTPATIENT
Start: 2022-06-02 | End: 2022-06-02

## 2022-06-02 RX ADMIN — FENTANYL CITRATE 50 MCG: 0.05 INJECTION, SOLUTION INTRAMUSCULAR; INTRAVENOUS at 10:23

## 2022-06-02 RX ADMIN — BUPIVACAINE HYDROCHLORIDE AND EPINEPHRINE 15 ML: 5; 5 INJECTION, SOLUTION PERINEURAL at 08:54

## 2022-06-02 RX ADMIN — ACETAMINOPHEN 975 MG: 325 TABLET ORAL at 08:47

## 2022-06-02 RX ADMIN — SODIUM CHLORIDE, SODIUM LACTATE, POTASSIUM CHLORIDE, CALCIUM CHLORIDE: 600; 310; 30; 20 INJECTION, SOLUTION INTRAVENOUS at 08:47

## 2022-06-02 RX ADMIN — FENTANYL CITRATE 100 MCG: 0.05 INJECTION, SOLUTION INTRAMUSCULAR; INTRAVENOUS at 08:53

## 2022-06-02 RX ADMIN — OXYCODONE HYDROCHLORIDE 5 MG: 5 TABLET ORAL at 11:21

## 2022-06-02 RX ADMIN — FENTANYL CITRATE 50 MCG: 0.05 INJECTION, SOLUTION INTRAMUSCULAR; INTRAVENOUS at 10:18

## 2022-06-02 RX ADMIN — CEFAZOLIN SODIUM 2 G: 2 INJECTION, SOLUTION INTRAVENOUS at 10:01

## 2022-06-02 RX ADMIN — LIDOCAINE HYDROCHLORIDE 60 MG: 20 INJECTION, SOLUTION INFILTRATION; PERINEURAL at 10:06

## 2022-06-02 RX ADMIN — ONDANSETRON 4 MG: 2 INJECTION INTRAMUSCULAR; INTRAVENOUS at 10:44

## 2022-06-02 RX ADMIN — BUPIVACAINE HYDROCHLORIDE AND EPINEPHRINE 15 ML: 5; 5 INJECTION, SOLUTION PERINEURAL at 08:58

## 2022-06-02 RX ADMIN — PROPOFOL 180 MCG/KG/MIN: 10 INJECTION, EMULSION INTRAVENOUS at 10:07

## 2022-06-02 NOTE — INTERVAL H&P NOTE
"I have reviewed the surgical (or preoperative) H&P that is linked to this encounter, and examined the patient. There are no significant changes    Clinical Conditions Present on Arrival:  Clinically Significant Risk Factors Present on Admission                   # Obesity: Estimated body mass index is 39.35 kg/m  as calculated from the following:    Height as of this encounter: 1.715 m (5' 7.5\").    Weight as of this encounter: 115.7 kg (255 lb).       "

## 2022-06-02 NOTE — ANESTHESIA PROCEDURE NOTES
Adductor canal Procedure Note    Pre-Procedure   Staff -        Anesthesiologist:  Juan Cardona MD       Performed By: anesthesiologist       Location: pre-op       Procedure Start/Stop Times: 6/2/2022 8:58 AM and 6/2/2022 8:59 AM       Pre-Anesthestic Checklist: patient identified, IV checked, site marked, risks and benefits discussed, informed consent, monitors and equipment checked, pre-op evaluation, at physician/surgeon's request and post-op pain management  Timeout:       Correct Patient: Yes        Correct Procedure: Yes        Correct Site: Yes        Correct Position: Yes        Correct Laterality: Yes        Site Marked: Yes  Procedure Documentation  Procedure: Adductor canal       Laterality: left       Patient Position: supine       Patient Prep/Sterile Barriers: sterile gloves       Skin prep: Chloraprep       Needle Type: short bevel       Needle Gauge: 20.        Needle Length (Inches): 6        Ultrasound guided       1. Ultrasound was used to identify targeted nerve, plexus, vascular marker, or fascial plane and place a needle adjacent to it in real-time.       2. Ultrasound was used to visualize the spread of anesthetic in close proximity to the above referenced structure.       3. A permanent image is entered into the patient's record.       4. The visualized anatomic structures appeared normal.       5. There were no apparent abnormal pathologic findings.    Assessment/Narrative         The placement was negative for: blood aspirated and painful injection       Paresthesias: No.       Bolus given via needle. no blood aspirated via catheter.        Secured via.        Insertion/Infusion Method: Single Shot       Complications: none       Injection made incrementally with aspirations every 5 mL.    Medication(s) Administered   Bupivacaine 0.5% w/ 1:200K Epi (Other) - Other   15 mL - 6/2/2022 8:58:00 AM  Medication Administration Time: 6/2/2022 8:58 AM

## 2022-06-02 NOTE — ANESTHESIA PREPROCEDURE EVALUATION
Anesthesia Pre-Procedure Evaluation    Patient: Deepika Oliva   MRN: [id number] : 1975        Procedure : Procedure(s):  RELEASE, plantar fascia left foot with gastrocnemius recession.          Past Medical History:   Diagnosis Date     Gastroesophageal reflux disease      Migraine      Motion sickness      Other chronic pain      Sleep apnea       Past Surgical History:   Procedure Laterality Date     CHOLECYSTECTOMY       HYSTERECTOMY       MR TEMPOROMANDIBULAR JOINTS       neck fusion        Allergies   Allergen Reactions     Codeine Hives     Severe Nausea and Vomiting,  Does OK with Norco And Percocet      Social History     Tobacco Use     Smoking status: Never Smoker     Smokeless tobacco: Never Used   Substance Use Topics     Alcohol use: Yes      Wt Readings from Last 1 Encounters:   22 115.7 kg (255 lb)        Anesthesia Evaluation            ROS/MED HX  ENT/Pulmonary:     (+) sleep apnea, uses CPAP,     Neurologic:       Cardiovascular:       METS/Exercise Tolerance:     Hematologic:       Musculoskeletal: Comment: Neck pain      GI/Hepatic:     (+) GERD,     Renal/Genitourinary:       Endo:     (+) Obesity,     Psychiatric/Substance Use:       Infectious Disease:       Malignancy:       Other:      (+) , H/O Chronic Pain,        Physical Exam    Airway  airway exam normal      Mallampati: II       Respiratory Devices and Support         Dental  no notable dental history         Cardiovascular   cardiovascular exam normal       Rhythm and rate: regular and normal     Pulmonary   pulmonary exam normal        breath sounds clear to auscultation           OUTSIDE LABS:  CBC: No results found for: WBC, HGB, HCT, PLT  BMP:   Lab Results   Component Value Date     2022    POTASSIUM 3.6 2022    CHLORIDE 108 2022    CO2 27 2022    BUN 10 2022    CR 0.91 2022     (H) 2022     COAGS: No results found for: PTT, INR, FIBR  POC: No results  found for: BGM, HCG, HCGS  HEPATIC: No results found for: ALBUMIN, PROTTOTAL, ALT, AST, GGT, ALKPHOS, BILITOTAL, BILIDIRECT, RACHEL  OTHER:   Lab Results   Component Value Date    TARIK 8.7 05/24/2022       Anesthesia Plan    ASA Status:  3      Anesthesia Type: General.     - Airway: Native airway   Induction: Intravenous, Propofol.   Maintenance: TIVA.        Consents    Anesthesia Plan(s) and associated risks, benefits, and realistic alternatives discussed. Questions answered and patient/representative(s) expressed understanding.    - Discussed:     - Discussed with:  Patient      - Extended Intubation/Ventilatory Support Discussed: No.      - Patient is DNR/DNI Status: No    Use of blood products discussed: No .     Postoperative Care    Pain management: Peripheral nerve block (Single Shot).   PONV prophylaxis: Ondansetron (or other 5HT-3), Dexamethasone or Solumedrol     Comments:    Other Comments: GA TIVA O2 facemask  Pop/saph block for POP            Juan Cardona MD

## 2022-06-02 NOTE — PROGRESS NOTES
Timeout performed: Yes 0850    Time timeout performed: 0850    Time procedure began: 0850    Block Assessment/Safety: (prior to administration of Versed and Fentanyl)    Nerve block pamphlet: included in discharge instructions    Safety instructions discussed and reviewed: Yes    Vitals and RASS score pre-procedure and every 5 minutes throughout procedure until block completed: All vitals stable  See VS flowsheet    Cardiac Rhythm: Normal sinus     Pain scale used: 0-10 Numeric Pain Rating Scale, with 10 being the worst pain imaginable    Pain prior to procedure: 3    Pain during procedure: 0/10    Pain post-procedure: 0/10    Time procedure ended: 0900    Patient observed 15 minutes post-procedure: Yes    Signs of local toxicity Signs/Symptoms: No  (CNS symptoms (may not occur): andrez-oral numbness/tingling, tinnitus, metallic taste, seizures  CV collapse: rhythm disturbance, hypotension, bradycardia, altered consciousness)

## 2022-06-02 NOTE — OP NOTE
Date: 6/2/22     Surgeon: LUIZ Michel DPM     Preoperative diagnosis:   1. Plantar Fasciitis left   2. Equinus left      Postoperative diagnosis: Same     Procedure:   1. Plantar Fasciectomy left foot   2. Gastrocnemius Recession left      Anesthesia: Popliteal with MAC     Hemostasis: Pneumatic calf tourniquet 250 mmHg     Pathology: Soft tissue     Injectables: 0.5% Marcaine plain, Kenalog 40      Materials: 3-0 vicryl, 3-0 nylon     Complications: None     Blood loss: 5 cc        Findings: Patient presents for operative intervention for on-going plantar fasciitis. We discussed today's procedure to include a plantar fasciectomy with gastrocnemius recession. Reviewed risks to include but not limited to infection, hematoma, on-going pain and need for additional surgery. She consents to surgery. Conservative measures were attempted and exhausted. Patient questions invited and answered, including appropriate risk, benefits and complications. No guarantees given or implied. Patient has been NPO.     Description: Patient was brought to the operating room and placed on the table in the prone position. IV-sedation and popliteal block was administered by the anesthesia department. The foot was then prepped and draped in usual aseptic manner. The extremity was elevated and exsanguinated. Well-padded calf pneumatic tourniquet was inflated to 250 mmHg and the following procedure was then performed: Attention was directed to the medial left calf just proximal to the ankle joint where a #15 blade was used to create a 2 cm longitudinal incision. The patient did experience pain and both surgical sites along the medial left calf and left heel was injected with 0.5% Marcaine plain. The calf incision was deepened via blunt dissection, superficial vessels cauterized. At this time the posterior fascia was identified and  from the underlying muscle belly. With care taken to avoid the sural nerve laterally, a mosher scissor was  used to resect the fascia from a medial to lateral direction with care taken to digitally palpate for any remaining lateral fibers with visualization of the lateral margin, again to avoid the sural nerve. Increased dorsiflexion of the left foot was noted. The surgical site was irrigated with normal saline.    The subcutaneous tissue was reapproximated with 3-0 vicryl in a simple suture fashion and the skin was closed with 3-0 nylon in a simple suture fashion.     Attention was directed to the medial left heel where a #15 blade was used to create a 2 cm longitudinal incision. The incision was deepened via blunt dissection, superficial vessels cauterized. At this time the plantar and dorsal aspect of the plantar fascia was identified just distal to the attachment to the calcaneus. Next, a mosher scissor was used to resect the fascia from a medial to lateral direction with care taken to digitally palpate for any remaining lateral fibers. The proximal margin of the plantar fascia was sent to pathology. The surgical site was irrigated with normal saline. The subcutaneous tissue was reapproximated with 3-0 vicryl in a simple suture fashion and the skin was closed with 3-0 nylon in a simple suture fashion. 2 ml Kenalog 40 injected into the surgical site.      Dressings consisted of adaptic, 4x4's, ABD, emiliano/kerlix roll. The pneumatic tourniquet was released and a hyperemic response was noted to the digits on the left foot.      The patient appeared to tolerate all the procedures and anesthesia well without apparent complications. Patient was transported from the operating room to the recovery room with vital signs stable and neurovascular status as it was pre-operatively to the left foot. Patient to be discharged home per anesthesia. Written and verbal homecare instructions given to remain non-weight bearing, keep surgical dressing intact. Patient to follow up in office for post-operative management in 1 week.

## 2022-06-02 NOTE — ANESTHESIA CARE TRANSFER NOTE
Patient: Deepika Oliva    Procedure: Procedure(s):  RELEASE, plantar fascia left foot with gastrocnemius recession.       Diagnosis: Plantar fasciitis [M72.2]  Equinus contracture of ankle [M24.573]  Diagnosis Additional Information: No value filed.    Anesthesia Type:   General     Note:    Oropharynx: oropharynx clear of all foreign objects  Level of Consciousness: awake  Oxygen Supplementation: room air    Independent Airway: airway patency satisfactory and stable  Dentition: dentition unchanged  Vital Signs Stable: post-procedure vital signs reviewed and stable  Report to RN Given: handoff report given  Patient transferred to: Phase II    Handoff Report: Identifed the Patient, Identified the Reponsible Provider, Reviewed the pertinent medical history, Discussed the surgical course, Reviewed Intra-OP anesthesia mangement and issues during anesthesia, Set expectations for post-procedure period and Allowed opportunity for questions and acknowledgement of understanding      Vitals:  Vitals Value Taken Time   /69 06/02/22 1106   Temp 36.4  C (97.6  F) 06/02/22 1106   Pulse 96 06/02/22 1106   Resp 14 06/02/22 1106   SpO2 95 % 06/02/22 1106       Electronically Signed By: LORRIE Kitchen CRNA  June 2, 2022  11:07 AM

## 2022-06-02 NOTE — ANESTHESIA POSTPROCEDURE EVALUATION
Patient: Deepika Oliva    Procedure: Procedure(s):  RELEASE, plantar fascia left foot with gastrocnemius recession.       Anesthesia Type:  General    Note:  Disposition: Outpatient   Postop Pain Control: Uneventful            Sign Out: Well controlled pain   PONV: No   Neuro/Psych: Uneventful            Sign Out: Acceptable/Baseline neuro status   Airway/Respiratory: Uneventful            Sign Out: Acceptable/Baseline resp. status   CV/Hemodynamics: Uneventful            Sign Out: Acceptable CV status; No obvious hypovolemia; No obvious fluid overload   Other NRE: NONE   DID A NON-ROUTINE EVENT OCCUR? No           Last vitals:  Vitals Value Taken Time   /74 06/02/22 1145   Temp 97  F (36.1  C) 06/02/22 1106   Pulse 81 06/02/22 1148   Resp 17 06/02/22 1130   SpO2 98 % 06/02/22 1148   Vitals shown include unvalidated device data.    Electronically Signed By: Juan Cardona MD  June 2, 2022  1:32 PM

## 2022-06-02 NOTE — ANESTHESIA PROCEDURE NOTES
Sciatic Procedure Note    Pre-Procedure   Staff -        Anesthesiologist:  Juan Cardona MD       Performed By: anesthesiologist       Location: pre-op       Procedure Start/Stop Times: 6/2/2022 8:54 AM and 6/2/2022 8:56 AM       Pre-Anesthestic Checklist: patient identified, IV checked, site marked, risks and benefits discussed, informed consent, monitors and equipment checked, pre-op evaluation, at physician/surgeon's request and post-op pain management  Timeout:       Correct Patient: Yes        Correct Procedure: Yes        Correct Site: Yes        Correct Position: Yes        Correct Laterality: Yes        Site Marked: Yes  Procedure Documentation  Procedure: Sciatic       Laterality: left       Patient Position: supine       Skin prep: Chloraprep (popliteal approach).       Needle Type: short bevel       Needle Gauge: 20.        Needle Length (Inches): 4        Ultrasound guided       1. Ultrasound was used to identify targeted nerve, plexus, vascular marker, or fascial plane and place a needle adjacent to it in real-time.       2. Ultrasound was used to visualize the spread of anesthetic in close proximity to the above referenced structure.       3. A permanent image is entered into the patient's record.       4. The visualized anatomic structures appeared normal.       5. There were no apparent abnormal pathologic findings.    Assessment/Narrative         The placement was negative for: blood aspirated, painful injection and site bleeding       Paresthesias: No.       Bolus given via needle..        Secured via.        Insertion/Infusion Method: Single Shot       Injection made incrementally with aspirations every 3 mL.    Medication(s) Administered   Bupivacaine 0.5% w/ 1:200K Epi (Other) - Other   15 mL - 6/2/2022 8:54:00 AM  Medication Administration Time: 6/2/2022 8:54 AM

## 2022-06-02 NOTE — DISCHARGE INSTRUCTIONS
If you have any questions or concerns regarding your procedure, please contact Dr. Michel, his office number is [phone].    You have received 975 mg of Acetaminophen (Tylenol) at 8:47 AM. Please do not take an additional dose of Tylenol until after 2:47 PM     Do not exceed 4,000 mg of acetaminophen during a 24 hour period and keep in mind that acetaminophen can also be found in many over-the-counter cold medications as well as narcotics that may be given for pain.     Leg, Knee and Foot blocks:  Do not attempt to walk or bear weight on your leg until all numbness has disappeared and full muscle strength has returned.  Protect yourself from falls!  Follow your surgeon's instructions about when you can begin to walk and put any weight on your leg or foot.   Do not stand or walk without assistance or your crutches if your foot or leg still feels numb, heavy or weak.      For All Types of Blocks:  You arm/leg will be weak, numb and difficult for your brain to locate.  It may feel heavy or absent.  This is normal.   Your hand/foot may feel warmer than usual after a nerve block.  This is also normal and will go away as the block wears off.  Depending on the medication administered, your block may last up to 24 hours.  Follow your surgeon's instructions for when you can move your arm or leg, take pain medications, use cold packs and care of your surgical site.      Contact your Surgeon for:  Inadequate pain control after taking your oral pain medications.  Nausea or vomiting at home.  Bleeding or any other concerns about the incision.   Cold and/or discolored fingers or toes.    If there are concerns related to the block you received, contact your Surgeon. They will then contact the Anesthesia team.   If the hematoma (bruise) at the site of the nerve block seems to be getting bigger.  If the site where the block was performed is red, swollen, draining or hot to touch.  If you develop a painful sensation down your arm  or leg.   Any concerns about your anesthetic or the nerve block.     Discharge Instructions for Foot Surgery    Arrange to have an adult drive you home after surgery. If you had general anesthesia, it may take a day or more to fully recover. So for at least the next 24 hours:  Do not drive or use machinery or power tools.  Do not drink alcohol.  Do not make any major decisions.     Diet    Here are some dietary suggestions following surgery:   Start with liquids and light foods (like dry toast, bananas, and applesauce). As you feel up to it, slowly return to your normal diet.  Drink at least 6 to 8 glasses of water or other nonalcoholic fluids a day.  To avoid nausea, eat before taking narcotic pain medicines.     Medicines    It is important to follow these directions:   Take all medicines as instructed.  Take pain medicines on time. Do not wait until the pain is bad before taking your medicines.  Avoid alcohol while on pain medicines.     Activity    These instructions are to help with your recovery:   Sit or lie down when possible. Put a pillow or 2 under your heel to raise your foot above the level of your heart.  Wrap an ice pack or bag of frozen peas in a thin cloth. Place it over your bandaged foot for no longer than 20 minutes. Do this 3 times a day.  You can drive again in 7 days or as instructed by your healthcare provider.  Wear your surgical shoe at all times unless told otherwise by your healthcare provider.  Use crutches or a cane as directed.  Follow your healthcare provider s instructions about putting weight on your foot.     Bandage and cast care    Here are tips to follow:   Do not shower for 48 hours.  When you can shower again, cover the bandage, splint, or cast with a plastic bag to keep it dry.  Don t remove your bandage until your healthcare provider tells you to. If your bandage gets wet or dirty, check with your healthcare provider. You can likely replace it with a clean, dry one.     What  to expect    It is normal to have the following:  Bruising and slight swelling of the foot and toes  A small amount of blood on the dressing     Call your healthcare provider     Contact your healthcare provider right away if you have any of the following:   Continuous bleeding through the bandage  Excessive swelling, increased bleeding, or redness  Fever over 101.5 F (38.6 C) or chills  Pain unrelieved by pain medicines  Foot feels cold to the touch or numb  Increased pain in your leg or foot  Chest pain or shortness of breath    Coping with pain    *Pain after surgery is normal and expected*    If you have pain after surgery, pain medicine will help you feel better. Take it as told, before pain becomes severe. Also, ask your healthcare provider or pharmacist about other ways to control pain. This might be with heat, ice, or relaxation. And follow any other instructions your surgeon or nurse gives you.    Tips for taking pain medicine    To get the best relief possible, remember these points:    Pain medicines can upset your stomach. Taking them with a little food may help.  Most pain relievers taken by mouth need at least 20 to 30 minutes to start to work.  Don't wait till your pain becomes severe before you take your medicine. Try to time your medicine so that you can take it before starting an activity. This might be before you get dressed, go for a walk, or sit down for dinner.  Constipation is a common side effect of pain medicines. You can take medicines such as laxatives (Miralax) or stool softeners to help ease constipation. Drinking lots of fluids and eating foods such as fruits and vegetables that are high in fiber can also help.  Drinking alcohol and taking pain medicine can cause dizziness and slow your breathing. It can even be deadly. Don't drink alcohol while taking pain medicine.  Pain medicine can make you react more slowly to things. Don't drive or run machinery while taking pain medicine.  Your  healthcare provider may tell you to take acetaminophen to help ease your pain. Ask him or her how much you are supposed to take each day. Acetaminophen or other pain relievers may interact with your prescription medicines or other over-the-counter (OTC) medicines. Some prescription medicines have acetaminophen and other ingredients. Using both prescription and OTC acetaminophen for pain can cause you to overdose. Read the labels on your OTC medicines with care. This will help you to clearly know the list of ingredients, how much to take, and any warnings. It may also help you not take too much acetaminophen. If you have questions or don't understand the information, ask your pharmacist or healthcare provider to explain it to you before you take the OTC medicine.    Discharge Instructions: After Your Surgery, regarding Anesthesia    You ve just had surgery. During surgery, you were given medicine called anesthesia to keep you relaxed and free of pain. After surgery, you may have some pain or nausea. This is common. Here are some tips for feeling better and getting well after surgery.    Going home    Your healthcare provider will show you how to take care of yourself when you go home. He or she will also answer your questions. Have an adult family member or friend drive you home. For the first 24 hours after your surgery:    Don't drive or use heavy equipment.  Don't make important decisions or sign legal papers.  Don't drink alcohol.  Have someone stay with you for the next 24 hours. He or she can watch for problems and help keep you safe.  Be sure to go to all follow-up visits with your healthcare provider. And rest after your surgery for as long as your healthcare provider tells you to.    Managing nausea    Some people have an upset stomach after surgery. This is often because of anesthesia, pain, or pain medicine, or the stress of surgery. These tips will help you handle nausea and eat healthy foods as you get  better. If you were on a special food plan before surgery, ask your healthcare provider if you should follow it while you get better. These tips may help:    Don't push yourself to eat. Your body will tell you when to eat and how much.  Start off with clear liquids and soup. They are easier to digest.  Next try semi-solid foods, such as mashed potatoes, applesauce, and gelatin, as you feel ready.  Slowly move to solid foods. Don t eat fatty, rich, or spicy foods at first.  Don't force yourself to have 3 large meals a day. Instead eat smaller amounts more often.  Take pain medicines with a small amount of solid food, such as crackers or toast, to prevent nausea.    If you have obstructive sleep apnea    You were given anesthesia medicine during surgery to keep you comfortable and free of pain. After surgery, you may have more apnea spells because of this medicine and other medicines you were given. The spells may last longer than usual.   At home:    Keep using the continuous positive airway pressure (CPAP) device when you sleep. Unless your healthcare provider tells you not to, use it when you sleep, day or night. CPAP is a common device used to treat obstructive sleep apnea.  Talk with your provider before taking any pain medicine, muscle relaxants, or sedatives. Your provider will tell you about the possible dangers of taking these medicines.

## 2022-06-07 ENCOUNTER — OFFICE VISIT (OUTPATIENT)
Dept: PODIATRY | Facility: CLINIC | Age: 47
End: 2022-06-07
Payer: COMMERCIAL

## 2022-06-07 VITALS — HEART RATE: 92 BPM | BODY MASS INDEX: 38.65 KG/M2 | TEMPERATURE: 98.3 F | HEIGHT: 68 IN | WEIGHT: 255 LBS

## 2022-06-07 DIAGNOSIS — M72.2 PLANTAR FASCIITIS, BILATERAL: Primary | ICD-10-CM

## 2022-06-07 DIAGNOSIS — M24.573 EQUINUS CONTRACTURE OF ANKLE: ICD-10-CM

## 2022-06-07 PROCEDURE — 99024 POSTOP FOLLOW-UP VISIT: CPT | Performed by: PODIATRIST

## 2022-06-07 ASSESSMENT — PAIN SCALES - GENERAL: PAINLEVEL: NO PAIN (0)

## 2022-06-07 NOTE — PROGRESS NOTES
Podiatry Progress Note        ASSESSMENT: S/P Plantar Fasciectomy/Gastrocnemius Recession left      TREATMENT:  -Surgical sites on the left heel and leg are progressing well. No signs of infection.     -I reviewed the pathology report on the left foot which is negative for malignancy.     -Gauze dressing applied today which she will keep intact. Continue non-weight bearing on the left foot.     -She will follow-up with me in 2 weeks for suture removal.     Juan J Michel DPM  [facility] Podiatry/Foot & Ankle Surgery      HPI: Deepika Oliva was seen again today s/p plantar fasciectomy and gastrocnemius recession left. Doing well today. She has remained non-weight bearing with a knee walker, denies pain.    Past Medical History:   Diagnosis Date     Gastroesophageal reflux disease      Migraine      Motion sickness      Other chronic pain      Sleep apnea        Allergies   Allergen Reactions     Codeine Hives     Severe Nausea and Vomiting,  Does OK with Norco And Percocet         Current Outpatient Medications:      ALPRAZolam (XANAX) 0.5 MG tablet, , Disp: , Rfl:      ARIPiprazole (ABILIFY) 2 MG tablet, Take 2 mg by mouth daily, Disp: , Rfl:      bimatoprost (LATISSE) 0.03 % external opthalmic solution, APPLY TO UPPER LASH LINE AT NIGHT, Disp: , Rfl:      cholecalciferol 25 MCG (1000 UT) TABS, Take 3,000 Units by mouth daily, Disp: , Rfl:      fluticasone (FLONASE) 50 MCG/ACT nasal spray, Spray 2 sprays in nostril daily, Disp: , Rfl:      ketorolac (TORADOL) 10 MG tablet, Take 1 tablet (10 mg) by mouth every 6 hours as needed for moderate pain, Disp: 20 tablet, Rfl: 0     linaclotide (LINZESS) 145 MCG capsule, , Disp: , Rfl:      omeprazole (PRILOSEC) 40 MG DR capsule, Take 40 mg by mouth, Disp: , Rfl:      ondansetron (ZOFRAN-ODT) 8 MG ODT tab, Place 8 mg under the tongue, Disp: , Rfl:      oxyCODONE (ROXICODONE) 5 MG tablet, Take 1-2 tablets (5-10 mg) by mouth every 6 hours as needed for moderate  "to severe pain, Disp: 18 tablet, Rfl: 0     promethazine (PHENERGAN) 12.5 MG tablet, , Disp: , Rfl:      Respiratory Therapy Supplies (CARETOUCH 2 CPAP HOSE ) MISC, CPAP machine for home use at pressure: 7 cm H20, nasal mask x1/3month with nasal cushion x2/mo, Disp: , Rfl:      Rimegepant Sulfate 75 MG TBDP, Take 75 mg by mouth See Admin Instructions Take 75 mg orally per 24 hours placed on or under the tongue; MAX 75 mg/24 hours;, Disp: 8 tablet, Rfl: 9     sertraline (ZOLOFT) 100 MG tablet, Take 150 mg by mouth daily, Disp: , Rfl:      tiZANidine (ZANAFLEX) 4 MG tablet, Take 4 mg by mouth as needed, Disp: , Rfl:      traZODone (DESYREL) 100 MG tablet, Take 50 mg by mouth At Bedtime, Disp: , Rfl:      tretinoin (RETIN-A) 0.05 % external cream, Apply 0.05 g topically daily, Disp: , Rfl:      vitamin B-12 (CYANOCOBALAMIN) 500 MCG tablet, Take 500 mcg by mouth daily, Disp: , Rfl:     Review of Systems - Negative       OBJECTIVE:  Appearance: alert, well appearing, and in no distress.    Pulse 92   Temp 98.3  F (36.8  C)   Ht 1.715 m (5' 7.5\")   Wt 115.7 kg (255 lb)   BMI 39.35 kg/m      @LDAVASC(10,16,17)@    No images are attached to the encounter.     Surgical sites on the medial left heel and leg have intact sutures with skin edges well approximated, no gapping noted. No erythema left foot. Neurovascular status unchanged left foot.     "

## 2022-06-07 NOTE — PATIENT INSTRUCTIONS
Continue non weight bearing in the CAM boot (OK to put a little pressure on the foot-but no walking!)     Leave gauze dressing on until your follow up appointment.

## 2022-06-07 NOTE — LETTER
6/7/2022         RE: Deepika Oliva  [address] [id number]        Dear Colleague,    Thank you for referring your patient, Deepika Oliva, to the [facility]. Please see a copy of my visit note below.    Podiatry Progress Note        ASSESSMENT: S/P Plantar Fasciectomy/Gastrocnemius Recession left      TREATMENT:  -Surgical sites on the left heel and leg are progressing well. No signs of infection.     -I reviewed the pathology report on the left foot which is negative for malignancy.     -Gauze dressing applied today which she will keep intact. Continue non-weight bearing on the left foot.     -She will follow-up with me in 2 weeks for suture removal.     Juan J Michel DPM  [facility] Podiatry/Foot & Ankle Surgery      HPI: Deepika Oliva was seen again today s/p plantar fasciectomy and gastrocnemius recession left. Doing well today. She has remained non-weight bearing with a knee walker, denies pain.    Past Medical History:   Diagnosis Date     Gastroesophageal reflux disease      Migraine      Motion sickness      Other chronic pain      Sleep apnea        Allergies   Allergen Reactions     Codeine Hives     Severe Nausea and Vomiting,  Does OK with Norco And Percocet         Current Outpatient Medications:      ALPRAZolam (XANAX) 0.5 MG tablet, , Disp: , Rfl:      ARIPiprazole (ABILIFY) 2 MG tablet, Take 2 mg by mouth daily, Disp: , Rfl:      bimatoprost (LATISSE) 0.03 % external opthalmic solution, APPLY TO UPPER LASH LINE AT NIGHT, Disp: , Rfl:      cholecalciferol 25 MCG (1000 UT) TABS, Take 3,000 Units by mouth daily, Disp: , Rfl:      fluticasone (FLONASE) 50 MCG/ACT nasal spray, Spray 2 sprays in nostril daily, Disp: , Rfl:      ketorolac (TORADOL) 10 MG tablet, Take 1 tablet (10 mg) by mouth every 6 hours as needed for moderate pain, Disp: 20 tablet, Rfl: 0     linaclotide (LINZESS) 145 MCG capsule, , Disp: ,  "Rfl:      omeprazole (PRILOSEC) 40 MG DR capsule, Take 40 mg by mouth, Disp: , Rfl:      ondansetron (ZOFRAN-ODT) 8 MG ODT tab, Place 8 mg under the tongue, Disp: , Rfl:      oxyCODONE (ROXICODONE) 5 MG tablet, Take 1-2 tablets (5-10 mg) by mouth every 6 hours as needed for moderate to severe pain, Disp: 18 tablet, Rfl: 0     promethazine (PHENERGAN) 12.5 MG tablet, , Disp: , Rfl:      Respiratory Therapy Supplies (CARETOUCH 2 CPAP HOSE ) MISC, CPAP machine for home use at pressure: 7 cm H20, nasal mask x1/3month with nasal cushion x2/mo, Disp: , Rfl:      Rimegepant Sulfate 75 MG TBDP, Take 75 mg by mouth See Admin Instructions Take 75 mg orally per 24 hours placed on or under the tongue; MAX 75 mg/24 hours;, Disp: 8 tablet, Rfl: 9     sertraline (ZOLOFT) 100 MG tablet, Take 150 mg by mouth daily, Disp: , Rfl:      tiZANidine (ZANAFLEX) 4 MG tablet, Take 4 mg by mouth as needed, Disp: , Rfl:      traZODone (DESYREL) 100 MG tablet, Take 50 mg by mouth At Bedtime, Disp: , Rfl:      tretinoin (RETIN-A) 0.05 % external cream, Apply 0.05 g topically daily, Disp: , Rfl:      vitamin B-12 (CYANOCOBALAMIN) 500 MCG tablet, Take 500 mcg by mouth daily, Disp: , Rfl:     Review of Systems - Negative       OBJECTIVE:  Appearance: alert, well appearing, and in no distress.    Pulse 92   Temp 98.3  F (36.8  C)   Ht 1.715 m (5' 7.5\")   Wt 115.7 kg (255 lb)   BMI 39.35 kg/m      @LDAVASC(10,16,17)@    No images are attached to the encounter.     Surgical sites on the medial left heel and leg have intact sutures with skin edges well approximated, no gapping noted. No erythema left foot. Neurovascular status unchanged left foot.         Again, thank you for allowing me to participate in the care of your patient.        Sincerely,        Juan J Michel DPM    "

## 2022-06-21 ENCOUNTER — OFFICE VISIT (OUTPATIENT)
Dept: PODIATRY | Facility: CLINIC | Age: 47
End: 2022-06-21
Payer: COMMERCIAL

## 2022-06-21 VITALS — TEMPERATURE: 98 F | SYSTOLIC BLOOD PRESSURE: 122 MMHG | DIASTOLIC BLOOD PRESSURE: 66 MMHG | HEART RATE: 90 BPM

## 2022-06-21 DIAGNOSIS — M24.573 EQUINUS CONTRACTURE OF ANKLE: ICD-10-CM

## 2022-06-21 DIAGNOSIS — M72.2 PLANTAR FASCIITIS, BILATERAL: Primary | ICD-10-CM

## 2022-06-21 PROCEDURE — 99024 POSTOP FOLLOW-UP VISIT: CPT | Performed by: PODIATRIST

## 2022-06-21 NOTE — PROGRESS NOTES
Podiatry Progress Note        ASSESSMENT: S/P Plantar Fasciectomy/Gastrocnemius Recession left      TREATMENT:  -Surgical sites on the left heel and leg are progressing well. Sutures removed today, steri-strips applied.     -I recommend she being walking in the CAM boot x2 days, then ween into regular shoes with gradual increase in activities over the next 7-10 days.     -She is discharged from my care and will follow-up with me as concerns develop.     Juan J Michel DPM  [facility] Podiatry/Foot & Ankle Surgery      HPI: Deepika Oliva was seen again today s/p plantar fasciectomy and gastrocnemius recession left. Doing well today. She has remained non-weight bearing with a knee walker.     Past Medical History:   Diagnosis Date     Gastroesophageal reflux disease      Migraine      Motion sickness      Other chronic pain      Sleep apnea        Allergies   Allergen Reactions     Codeine Hives     Severe Nausea and Vomiting,  Does OK with Norco And Percocet         Current Outpatient Medications:      ALPRAZolam (XANAX) 0.5 MG tablet, , Disp: , Rfl:      ARIPiprazole (ABILIFY) 2 MG tablet, Take 2 mg by mouth daily, Disp: , Rfl:      bimatoprost (LATISSE) 0.03 % external opthalmic solution, APPLY TO UPPER LASH LINE AT NIGHT, Disp: , Rfl:      cholecalciferol 25 MCG (1000 UT) TABS, Take 3,000 Units by mouth daily, Disp: , Rfl:      fluticasone (FLONASE) 50 MCG/ACT nasal spray, Spray 2 sprays in nostril daily, Disp: , Rfl:      ketorolac (TORADOL) 10 MG tablet, Take 1 tablet (10 mg) by mouth every 6 hours as needed for moderate pain, Disp: 20 tablet, Rfl: 0     linaclotide (LINZESS) 145 MCG capsule, , Disp: , Rfl:      omeprazole (PRILOSEC) 40 MG DR capsule, Take 40 mg by mouth, Disp: , Rfl:      ondansetron (ZOFRAN-ODT) 8 MG ODT tab, Place 8 mg under the tongue, Disp: , Rfl:      oxyCODONE (ROXICODONE) 5 MG tablet, Take 1-2 tablets (5-10 mg) by mouth every 6 hours as needed for moderate to severe pain,  Disp: 18 tablet, Rfl: 0     PAXLOVID therapy pack, TAKE 2 NIRMATRELVIR 150MG PINK-OVAL TABLETS AND 1 RITONAVIR 100MG WHITE-OVAL TABLET TOGETHER TWICE DAILY FOR 10 DOSES, Disp: , Rfl:      promethazine (PHENERGAN) 12.5 MG tablet, , Disp: , Rfl:      Respiratory Therapy Supplies (CARETOUCH 2 CPAP HOSE ) MISC, CPAP machine for home use at pressure: 7 cm H20, nasal mask x1/3month with nasal cushion x2/mo, Disp: , Rfl:      Rimegepant Sulfate 75 MG TBDP, Take 75 mg by mouth See Admin Instructions Take 75 mg orally per 24 hours placed on or under the tongue; MAX 75 mg/24 hours;, Disp: 8 tablet, Rfl: 9     sertraline (ZOLOFT) 100 MG tablet, Take 150 mg by mouth daily, Disp: , Rfl:      tiZANidine (ZANAFLEX) 4 MG tablet, Take 4 mg by mouth as needed, Disp: , Rfl:      traZODone (DESYREL) 100 MG tablet, Take 50 mg by mouth At Bedtime, Disp: , Rfl:      tretinoin (RETIN-A) 0.05 % external cream, Apply 0.05 g topically daily, Disp: , Rfl:      vitamin B-12 (CYANOCOBALAMIN) 500 MCG tablet, Take 500 mcg by mouth daily, Disp: , Rfl:     Review of Systems - Negative       OBJECTIVE:  Appearance: alert, well appearing, and in no distress.    /66   Pulse 90   Temp 98  F (36.7  C)     @LDAVASC(10,16,17)@    No images are attached to the encounter.     Surgical sites on the medial left heel and leg have intact sutures with skin edges well coapted, no gapping noted. No erythema left foot. Neurovascular status unchanged left foot.

## 2022-06-21 NOTE — LETTER
6/21/2022         RE: Deepika Oliva  [address] [facility] [id number]        Dear Colleague,    Thank you for referring your patient, Deepika Oliva, to the [facility]. Please see a copy of my visit note below.    Podiatry Progress Note        ASSESSMENT: S/P Plantar Fasciectomy/Gastrocnemius Recession left      TREATMENT:  -Surgical sites on the left heel and leg are progressing well. Sutures removed today, steri-strips applied.     -I recommend she being walking in the CAM boot x2 days, then ween into regular shoes with gradual increase in activities over the next 7-10 days.     -She is discharged from my care and will follow-up with me as concerns develop.     Juan J Michel, DESHAUN  [facility] Podiatry/Foot & Ankle Surgery      HPI: Deepika Oliva was seen again today s/p plantar fasciectomy and gastrocnemius recession left. Doing well today. She has remained non-weight bearing with a knee walker.     Past Medical History:   Diagnosis Date     Gastroesophageal reflux disease      Migraine      Motion sickness      Other chronic pain      Sleep apnea        Allergies   Allergen Reactions     Codeine Hives     Severe Nausea and Vomiting,  Does OK with Norco And Percocet         Current Outpatient Medications:      ALPRAZolam (XANAX) 0.5 MG tablet, , Disp: , Rfl:      ARIPiprazole (ABILIFY) 2 MG tablet, Take 2 mg by mouth daily, Disp: , Rfl:      bimatoprost (LATISSE) 0.03 % external opthalmic solution, APPLY TO UPPER LASH LINE AT NIGHT, Disp: , Rfl:      cholecalciferol 25 MCG (1000 UT) TABS, Take 3,000 Units by mouth daily, Disp: , Rfl:      fluticasone (FLONASE) 50 MCG/ACT nasal spray, Spray 2 sprays in nostril daily, Disp: , Rfl:      ketorolac (TORADOL) 10 MG tablet, Take 1 tablet (10 mg) by mouth every 6 hours as needed for moderate pain, Disp: 20 tablet, Rfl: 0     linaclotide (LINZESS) 145 MCG capsule, , Disp: , Rfl:       omeprazole (PRILOSEC) 40 MG DR capsule, Take 40 mg by mouth, Disp: , Rfl:      ondansetron (ZOFRAN-ODT) 8 MG ODT tab, Place 8 mg under the tongue, Disp: , Rfl:      oxyCODONE (ROXICODONE) 5 MG tablet, Take 1-2 tablets (5-10 mg) by mouth every 6 hours as needed for moderate to severe pain, Disp: 18 tablet, Rfl: 0     PAXLOVID therapy pack, TAKE 2 NIRMATRELVIR 150MG PINK-OVAL TABLETS AND 1 RITONAVIR 100MG WHITE-OVAL TABLET TOGETHER TWICE DAILY FOR 10 DOSES, Disp: , Rfl:      promethazine (PHENERGAN) 12.5 MG tablet, , Disp: , Rfl:      Respiratory Therapy Supplies (CARETOUCH 2 CPAP HOSE ) MISC, CPAP machine for home use at pressure: 7 cm H20, nasal mask x1/3month with nasal cushion x2/mo, Disp: , Rfl:      Rimegepant Sulfate 75 MG TBDP, Take 75 mg by mouth See Admin Instructions Take 75 mg orally per 24 hours placed on or under the tongue; MAX 75 mg/24 hours;, Disp: 8 tablet, Rfl: 9     sertraline (ZOLOFT) 100 MG tablet, Take 150 mg by mouth daily, Disp: , Rfl:      tiZANidine (ZANAFLEX) 4 MG tablet, Take 4 mg by mouth as needed, Disp: , Rfl:      traZODone (DESYREL) 100 MG tablet, Take 50 mg by mouth At Bedtime, Disp: , Rfl:      tretinoin (RETIN-A) 0.05 % external cream, Apply 0.05 g topically daily, Disp: , Rfl:      vitamin B-12 (CYANOCOBALAMIN) 500 MCG tablet, Take 500 mcg by mouth daily, Disp: , Rfl:     Review of Systems - Negative       OBJECTIVE:  Appearance: alert, well appearing, and in no distress.    /66   Pulse 90   Temp 98  F (36.7  C)     @LDAVASC(10,16,17)@    No images are attached to the encounter.     Surgical sites on the medial left heel and leg have intact sutures with skin edges well coapted, no gapping noted. No erythema left foot. Neurovascular status unchanged left foot.         Again, thank you for allowing me to participate in the care of your patient.        Sincerely,        Juan J Michel DPM

## 2022-06-21 NOTE — PATIENT INSTRUCTIONS
Wean out of the CAM boot over the next 7-10 days gradually increase activity as tolerated and return to regular shoes.

## 2022-06-27 ENCOUNTER — TELEPHONE (OUTPATIENT)
Dept: VASCULAR SURGERY | Facility: CLINIC | Age: 47
End: 2022-06-27

## 2022-06-27 NOTE — TELEPHONE ENCOUNTER
LM that the only documentation I have is short term disability claim form that was filled out and scanned to her chart on 6/17. Checked Dr. Michel's in-basket and do not see a workability form that was received. Gave fax number and call back with questions.

## 2022-06-28 ENCOUNTER — E-VISIT (OUTPATIENT)
Dept: URGENT CARE | Facility: CLINIC | Age: 47
End: 2022-06-28
Payer: COMMERCIAL

## 2022-06-28 DIAGNOSIS — J01.90 ACUTE SINUSITIS WITH SYMPTOMS > 10 DAYS: Primary | ICD-10-CM

## 2022-06-28 PROCEDURE — 99421 OL DIG E/M SVC 5-10 MIN: CPT

## 2022-06-28 NOTE — PATIENT INSTRUCTIONS
Sinusitis (Antibiotic Treatment)    The sinuses are air-filled spaces within the bones of the face. They connect to the inside of the nose. Sinusitis is an inflammation of the tissue that lines the sinuses. Sinusitis can occur during a cold. It can also happen due to allergies to pollens and other particles in the air. Sinusitis can cause symptoms of sinus congestion and a feeling of fullness. A sinus infection causes fever, headache, and facial pain. There is often green or yellow fluid draining from the nose or into the back of the throat (post-nasal drip). You have been given antibiotics to treat this condition.   Home care  Take the full course of antibiotics as instructed. Don't stop taking them, even when you feel better.  Drink plenty of water, hot tea, and other liquids as directed by the healthcare provider. This may help thin nasal mucus. It also may help your sinuses drain fluids.  Heat may help soothe painful areas of your face. Use a towel soaked in hot water. Or,  the shower and direct the warm spray onto your face. Using a vaporizer along with a menthol rub at night may also help soothe symptoms.   An expectorant with guaifenesin may help thin nasal mucus and help your sinuses drain fluids. Talk with your provider or pharmacists before taking an over-the-counter (OTC) medicine if you have any questions about it or its side effects..  You can use an OTC decongestant, unless a similar medicine was prescribed to you. Nasal sprays work the fastest. Use one that contains phenylephrine or oxymetazoline. First blow your nose gently. Then use the spray. Don't use these medicines more often than directed on the label. If you do, your symptoms may get worse. You may also take pills that contain pseudoephedrine. Don t use products that combine multiple medicines. This is because side effects may be increased. Read labels. You can also ask the pharmacist for help. (People with high blood pressure  should not use decongestants. They can raise blood pressure.) Talk with your provider or pharmacist if you have any questions about the medicine..  OTC antihistamines may help if allergies contributed to your sinusitis. Talk with your provider or pharmacist if you have any questions about the medicine..  Don't use nasal rinses or irrigation during an acute sinus infection, unless your healthcare provider tells you to. Rinsing may spread the infection to other areas in your sinuses.  Use acetaminophen or ibuprofen to control pain, unless another pain medicine was prescribed to you. If you have chronic liver or kidney disease or ever had a stomach ulcer, talk with your healthcare provider before using these medicines. Never give aspirin to anyone under age 18 who is ill with a fever. It may cause severe liver damage.  Don't smoke. This can make symptoms worse.    Follow-up care  Follow up with your healthcare provider, or as advised.   When to seek medical advice  Call your healthcare provider if any of these occur:   Facial pain or headache that gets worse  Stiff neck  Unusual drowsiness or confusion  Swelling of your forehead or eyelids  Symptoms don't go away in 10 days  Vision problems, such as blurred or double vision  Fever of 100.4 F (38 C) or higher, or as directed by your healthcare provider  Call 911  Call 911 if any of these occur:   Seizure  Trouble breathing  Feeling dizzy or faint  Fingernails, skin or lips look blue, purple , or gray  Prevention  Here are steps you can take to help prevent an infection:   Keep good hand washing habits.  Don t have close contact with people who have sore throats, colds, or other upper respiratory infections.  Don t smoke, and stay away from secondhand smoke.  Stay up to date with of your vaccines.  [employer] last reviewed this educational content on 12/1/2019 2000-2021 [employer]. All rights reserved. This information is not intended as a substitute for  professional medical care. Always follow your healthcare professional's instructions.        Dear Deepika Oliva    After reviewing your responses, I've been able to diagnose you with?a sinus infection caused by bacteria.?     Based on your responses and diagnosis, I have prescribed Augmentin to treat your symptoms. I have sent this to your pharmacy.?     It is also important to stay well hydrated, get lots of rest and take over-the-counter decongestants,?tylenol?or ibuprofen if you?are able to?take those medications per your primary care provider to help relieve discomfort.?     It is important that you take?all of?your prescribed medication even if your symptoms are improving after a few doses.? Taking?all of?your medicine helps prevent the symptoms from returning.?     If your symptoms worsen, you develop severe headache, vomiting, high fever (>102), or are not improving in 7 days, please contact your primary care provider for an appointment or visit any of our convenient Walk-in Care or Urgent Care Centers to be seen which can be found on our website?here.?     Thanks again for choosing?us?as your health care partner,?   ?  Melony Mcclendon PA-C, PAIASEL?

## 2022-07-20 ASSESSMENT — ANXIETY QUESTIONNAIRES
GAD7 TOTAL SCORE: 2
GAD7 TOTAL SCORE: 2
3. WORRYING TOO MUCH ABOUT DIFFERENT THINGS: NOT AT ALL
5. BEING SO RESTLESS THAT IT IS HARD TO SIT STILL: NOT AT ALL
7. FEELING AFRAID AS IF SOMETHING AWFUL MIGHT HAPPEN: NOT AT ALL
2. NOT BEING ABLE TO STOP OR CONTROL WORRYING: NOT AT ALL
7. FEELING AFRAID AS IF SOMETHING AWFUL MIGHT HAPPEN: NOT AT ALL
6. BECOMING EASILY ANNOYED OR IRRITABLE: SEVERAL DAYS
IF YOU CHECKED OFF ANY PROBLEMS ON THIS QUESTIONNAIRE, HOW DIFFICULT HAVE THESE PROBLEMS MADE IT FOR YOU TO DO YOUR WORK, TAKE CARE OF THINGS AT HOME, OR GET ALONG WITH OTHER PEOPLE: NOT DIFFICULT AT ALL
4. TROUBLE RELAXING: NOT AT ALL
8. IF YOU CHECKED OFF ANY PROBLEMS, HOW DIFFICULT HAVE THESE MADE IT FOR YOU TO DO YOUR WORK, TAKE CARE OF THINGS AT HOME, OR GET ALONG WITH OTHER PEOPLE?: NOT DIFFICULT AT ALL
1. FEELING NERVOUS, ANXIOUS, OR ON EDGE: SEVERAL DAYS
GAD7 TOTAL SCORE: 2

## 2022-07-20 ASSESSMENT — PATIENT HEALTH QUESTIONNAIRE - PHQ9
SUM OF ALL RESPONSES TO PHQ QUESTIONS 1-9: 4
10. IF YOU CHECKED OFF ANY PROBLEMS, HOW DIFFICULT HAVE THESE PROBLEMS MADE IT FOR YOU TO DO YOUR WORK, TAKE CARE OF THINGS AT HOME, OR GET ALONG WITH OTHER PEOPLE: NOT DIFFICULT AT ALL
SUM OF ALL RESPONSES TO PHQ QUESTIONS 1-9: 4

## 2022-07-22 ENCOUNTER — OFFICE VISIT (OUTPATIENT)
Dept: FAMILY MEDICINE | Facility: CLINIC | Age: 47
End: 2022-07-22
Payer: COMMERCIAL

## 2022-07-22 VITALS
WEIGHT: 261.9 LBS | HEART RATE: 101 BPM | RESPIRATION RATE: 14 BRPM | DIASTOLIC BLOOD PRESSURE: 74 MMHG | BODY MASS INDEX: 39.69 KG/M2 | SYSTOLIC BLOOD PRESSURE: 112 MMHG | OXYGEN SATURATION: 98 % | HEIGHT: 68 IN

## 2022-07-22 DIAGNOSIS — R73.01 ELEVATED FASTING GLUCOSE: ICD-10-CM

## 2022-07-22 DIAGNOSIS — Z11.59 NEED FOR HEPATITIS C SCREENING TEST: ICD-10-CM

## 2022-07-22 DIAGNOSIS — Z13.1 SCREENING FOR DIABETES MELLITUS: ICD-10-CM

## 2022-07-22 DIAGNOSIS — Z13.220 SCREENING FOR HYPERLIPIDEMIA: ICD-10-CM

## 2022-07-22 DIAGNOSIS — Z11.4 SCREENING FOR HIV (HUMAN IMMUNODEFICIENCY VIRUS): ICD-10-CM

## 2022-07-22 DIAGNOSIS — F51.01 PRIMARY INSOMNIA: ICD-10-CM

## 2022-07-22 DIAGNOSIS — F33.42 RECURRENT MAJOR DEPRESSIVE DISORDER, IN FULL REMISSION (H): Primary | ICD-10-CM

## 2022-07-22 DIAGNOSIS — F41.1 GAD (GENERALIZED ANXIETY DISORDER): ICD-10-CM

## 2022-07-22 DIAGNOSIS — E66.01 MORBID OBESITY (H): ICD-10-CM

## 2022-07-22 LAB
CHOLEST SERPL-MCNC: 208 MG/DL
FASTING STATUS PATIENT QL REPORTED: YES
GLUCOSE SERPL-MCNC: 92 MG/DL (ref 70–99)
HBA1C MFR BLD: 5.3 % (ref 0–5.6)
HDLC SERPL-MCNC: 51 MG/DL
LDLC SERPL CALC-MCNC: 120 MG/DL
NONHDLC SERPL-MCNC: 157 MG/DL
TRIGL SERPL-MCNC: 185 MG/DL

## 2022-07-22 PROCEDURE — 36415 COLL VENOUS BLD VENIPUNCTURE: CPT | Performed by: FAMILY MEDICINE

## 2022-07-22 PROCEDURE — 82947 ASSAY GLUCOSE BLOOD QUANT: CPT | Performed by: FAMILY MEDICINE

## 2022-07-22 PROCEDURE — 99214 OFFICE O/P EST MOD 30 MIN: CPT | Performed by: FAMILY MEDICINE

## 2022-07-22 PROCEDURE — 87389 HIV-1 AG W/HIV-1&-2 AB AG IA: CPT | Performed by: FAMILY MEDICINE

## 2022-07-22 PROCEDURE — 86803 HEPATITIS C AB TEST: CPT | Performed by: FAMILY MEDICINE

## 2022-07-22 PROCEDURE — 83036 HEMOGLOBIN GLYCOSYLATED A1C: CPT | Performed by: FAMILY MEDICINE

## 2022-07-22 PROCEDURE — 80061 LIPID PANEL: CPT | Performed by: FAMILY MEDICINE

## 2022-07-22 RX ORDER — ARIPIPRAZOLE 2 MG/1
2 TABLET ORAL DAILY
Qty: 90 TABLET | Refills: 3 | Status: SHIPPED | OUTPATIENT
Start: 2022-07-22 | End: 2022-09-26

## 2022-07-22 RX ORDER — SERTRALINE HYDROCHLORIDE 100 MG/1
150 TABLET, FILM COATED ORAL DAILY
Qty: 135 TABLET | Refills: 3 | Status: SHIPPED | OUTPATIENT
Start: 2022-07-22 | End: 2022-12-19

## 2022-07-22 RX ORDER — CELECOXIB 200 MG/1
CAPSULE ORAL
COMMUNITY
Start: 2022-07-17 | End: 2022-12-02

## 2022-07-22 RX ORDER — SERTRALINE HYDROCHLORIDE 100 MG/1
150 TABLET, FILM COATED ORAL DAILY
Status: CANCELLED | OUTPATIENT
Start: 2022-07-22

## 2022-07-22 RX ORDER — ARIPIPRAZOLE 2 MG/1
2 TABLET ORAL DAILY
Status: CANCELLED | OUTPATIENT
Start: 2022-07-22

## 2022-07-22 RX ORDER — TRAZODONE HYDROCHLORIDE 100 MG/1
100 TABLET ORAL AT BEDTIME
Qty: 30 TABLET | Refills: 3 | Status: SHIPPED | OUTPATIENT
Start: 2022-07-22 | End: 2022-12-02

## 2022-07-22 ASSESSMENT — ANXIETY QUESTIONNAIRES: GAD7 TOTAL SCORE: 2

## 2022-07-22 ASSESSMENT — PATIENT HEALTH QUESTIONNAIRE - PHQ9
SUM OF ALL RESPONSES TO PHQ QUESTIONS 1-9: 4
10. IF YOU CHECKED OFF ANY PROBLEMS, HOW DIFFICULT HAVE THESE PROBLEMS MADE IT FOR YOU TO DO YOUR WORK, TAKE CARE OF THINGS AT HOME, OR GET ALONG WITH OTHER PEOPLE: NOT DIFFICULT AT ALL

## 2022-07-22 NOTE — PROGRESS NOTES
"  Assessment & Plan     (F33.42) Recurrent major depressive disorder, in full remission (H)  (primary encounter diagnosis)  Comment: recurrent major depression for years. well controlled with medication and no side effect. She is doing well. She likes continue current plan. Denies suicide and homicide.   Plan: sertraline (ZOLOFT) 100 MG tablet, ARIPiprazole        (ABILIFY) 2 MG tablet        Continue current plan. Side effect addressed.     (F41.1) JUMA (generalized anxiety disorder)  Comment: well controlled with medication.   Plan: sertraline (ZOLOFT) 100 MG tablet, ARIPiprazole        (ABILIFY) 2 MG tablet        Continue current plan.     (F51.01) Primary insomnia  Comment: sometimes pt has hard time to fall asleep due to stress. She has bertram and she wear c pap. Trazodone prn helps.   Plan: traZODone (DESYREL) 100 MG tablet        Sleep hygiene addressed.     (R73.01) Elevated fasting glucose  Comment: we reviewed the lab. She has elevated fasting glucose . We reviewed her bp and has been normal in office.   Plan: a1c 5.3 that is normal. No diabetes.     (Z13.1) Screening for diabetes mellitus  Comment: a1c 5.3  Plan: Glucose, Hemoglobin A1c            (Z11.4) Screening for HIV (human immunodeficiency virus)  Comment:   Plan: HIV Antigen Antibody Combo            (Z11.59) Need for hepatitis C screening test  Comment:   Plan: Hepatitis C Screen Reflex to HCV RNA Quant and         Genotype            (Z13.220) Screening for hyperlipidemia  Comment:   Plan: Lipid panel reflex to direct LDL Non-fasting            (E66.01) Morbid obesity (H)  Comment: bmi 40 and failed to loss weight   Plan: advise regular exercise and low fat diet.       BMI:   Estimated body mass index is 40.41 kg/m  as calculated from the following:    Height as of this encounter: 1.715 m (5' 7.5\").    Weight as of this encounter: 118.8 kg (261 lb 14.4 oz).   Weight management plan: Discussed healthy diet and exercise guidelines        Return in " about 3 months (around 10/22/2022).    Meseret Iqbal MD  [facility] KARTHIKEYAN Poe is a 47 year old, presenting for the following health issues:  Glucose      History of Present Illness       Mental Health Follow-up:  Patient presents to follow-up on Depression & Anxiety.Patient's depression since last visit has been:  Better  The patient is not having other symptoms associated with depression.  Patient's anxiety since last visit has been:  Better  The patient is not having other symptoms associated with anxiety.  Any significant life events: health concerns  Patient is not feeling anxious or having panic attacks.  Patient has no concerns about alcohol or drug use.    Hypertension: She presents for follow up of hypertension.  She does not check blood pressure  regularly outside of the clinic. Outside blood pressures have been over 140/90. She follows a low salt diet.      Today's PHQ-9         PHQ-9 Total Score: 4    PHQ-9 Q9 Thoughts of better off dead/self-harm past 2 weeks :   Not at all    How difficult have these problems made it for you to do your work, take care of things at home, or get along with other people: Not difficult at all  Today's JUMA-7 Score: 2       Depression and Anxiety Follow-Up    How are you doing with your depression since your last visit? Improved     How are you doing with your anxiety since your last visit?  Improved     Are you having other symptoms that might be associated with depression or anxiety? No    Have you had a significant life event? No     Do you have any concerns with your use of alcohol or other drugs? No    Social History     Tobacco Use     Smoking status: Never Smoker     Smokeless tobacco: Never Used   Substance Use Topics     Alcohol use: Yes     Drug use: Not Currently     PHQ 7/20/2022   PHQ-9 Total Score 4   Q9: Thoughts of better off dead/self-harm past 2 weeks Not at all     JUMA-7 SCORE 7/20/2022   Total Score 2 (minimal  "anxiety)   Total Score 2     Last PHQ-9 7/20/2022   1.  Little interest or pleasure in doing things 1   2.  Feeling down, depressed, or hopeless 0   3.  Trouble falling or staying asleep, or sleeping too much 0   4.  Feeling tired or having little energy 0   5.  Poor appetite or overeating 1   6.  Feeling bad about yourself 1   7.  Trouble concentrating 1   8.  Moving slowly or restless 0   Q9: Thoughts of better off dead/self-harm past 2 weeks 0   PHQ-9 Total Score 4     JUMA-7  7/20/2022   1. Feeling nervous, anxious, or on edge 1   2. Not being able to stop or control worrying 0   3. Worrying too much about different things 0   4. Trouble relaxing 0   5. Being so restless that it is hard to sit still 0   6. Becoming easily annoyed or irritable 1   7. Feeling afraid, as if something awful might happen 0   JUMA-7 Total Score 2   If you checked any problems, how difficult have they made it for you to do your work, take care of things at home, or get along with other people? Not difficult at all       Suicide Assessment Five-step Evaluation and Treatment (SAFE-T)  {Provider  Link to Depression Care Package SmartSet :150     Insomnia: stress at work and sometimes has hard time to fall asleep. She takes trazodone prn that helps.    She had several times of elevated fasting glucose. We reviewed the lab.   Review of Systems   Constitutional, HEENT, cardiovascular, pulmonary, gi and gu systems are negative, except as otherwise noted.      Objective    /74 (BP Location: Right arm, Patient Position: Sitting, Cuff Size: Adult Large)   Pulse 101   Resp 14   Ht 1.715 m (5' 7.5\")   Wt 118.8 kg (261 lb 14.4 oz)   SpO2 98%   BMI 40.41 kg/m    Body mass index is 40.41 kg/m .  Physical Exam   GENERAL: healthy, alert and no distress  NECK: no adenopathy, no asymmetry, masses, or scars and thyroid normal to palpation  RESP: lungs clear to auscultation - no rales, rhonchi or wheezes  CV: regular rate and rhythm, normal S1 " S2, no S3 or S4, no murmur, click or rub, no peripheral edema and peripheral pulses strong   MS: no gross musculoskeletal defects noted, no edema               .  ..

## 2022-07-25 LAB
HCV AB SERPL QL IA: NONREACTIVE
HIV 1+2 AB+HIV1 P24 AG SERPL QL IA: NONREACTIVE

## 2022-07-26 ENCOUNTER — OFFICE VISIT (OUTPATIENT)
Dept: PODIATRY | Facility: CLINIC | Age: 47
End: 2022-07-26
Payer: COMMERCIAL

## 2022-07-26 VITALS
BODY MASS INDEX: 40.28 KG/M2 | DIASTOLIC BLOOD PRESSURE: 84 MMHG | TEMPERATURE: 98.5 F | WEIGHT: 261 LBS | SYSTOLIC BLOOD PRESSURE: 126 MMHG

## 2022-07-26 DIAGNOSIS — L03.119 CELLULITIS AND ABSCESS OF FOOT, EXCEPT TOES: Primary | ICD-10-CM

## 2022-07-26 DIAGNOSIS — L02.619 CELLULITIS AND ABSCESS OF FOOT, EXCEPT TOES: Primary | ICD-10-CM

## 2022-07-26 PROCEDURE — 99024 POSTOP FOLLOW-UP VISIT: CPT | Performed by: PODIATRIST

## 2022-07-26 RX ORDER — SULFAMETHOXAZOLE/TRIMETHOPRIM 800-160 MG
1 TABLET ORAL 2 TIMES DAILY
Qty: 20 TABLET | Refills: 0 | Status: SHIPPED | OUTPATIENT
Start: 2022-07-26 | End: 2022-08-09

## 2022-07-26 NOTE — PROGRESS NOTES
FOOT AND ANKLE SURGERY/PODIATRY PROGRESS NOTE        ASSESSMENT: Cellulitis left foot      TREATMENT:  -There is a small area of superficial gapping along the medial left heel with localized erythema.     -I recommend she reduce walking and will consider use of a CAM boot if the surgical site does not fully heal over the next week.    -I will start her on Bactrim.     -Patient's questions invited and answered. Patient to return to clinic in 1 week(s) for re-evaluation. She was encouraged to call my office with any further questions or concerns.     Juan J Michel DPM  [facility] Podiatry/Foot & Ankle Surgery      HPI: Deepika Oliva was seen again today for left heel drainage and discomfort. She is S/P Plantar Fasciectomy/Gastrocnemius Recession left on 6/2. She has noticed drainage from the left heel with numbness along the heel. Currently walking in gym shoes.     Past Medical History:   Diagnosis Date     Gastroesophageal reflux disease      Migraine      Motion sickness      Other chronic pain      Sleep apnea        Past Surgical History:   Procedure Laterality Date     CHOLECYSTECTOMY       HYSTERECTOMY       MR TEMPOROMANDIBULAR JOINTS       neck fusion       RELEASE PLANTAR FASCIA Left 6/2/2022    Procedure: RELEASE, plantar fascia left foot with gastrocnemius recession.;  Surgeon: Juan J Michel DPM;  Location: [facility]       Allergies   Allergen Reactions     Codeine Hives     Severe Nausea and Vomiting,  Does OK with Norco And Percocet         Current Outpatient Medications:      ALPRAZolam (XANAX) 0.5 MG tablet, , Disp: , Rfl:      ARIPiprazole (ABILIFY) 2 MG tablet, Take 1 tablet (2 mg) by mouth daily, Disp: 90 tablet, Rfl: 3     bimatoprost (LATISSE) 0.03 % external opthalmic solution, APPLY TO UPPER LASH LINE AT NIGHT, Disp: , Rfl:      celecoxib (CELEBREX) 200 MG capsule, , Disp: , Rfl:      cholecalciferol 25 MCG (1000 UT) TABS, Take 3,000 Units by mouth daily,  Disp: , Rfl:      fluticasone (FLONASE) 50 MCG/ACT nasal spray, Spray 2 sprays in nostril daily, Disp: , Rfl:      ketorolac (TORADOL) 10 MG tablet, Take 1 tablet (10 mg) by mouth every 6 hours as needed for moderate pain, Disp: 20 tablet, Rfl: 0     linaclotide (LINZESS) 145 MCG capsule, , Disp: , Rfl:      omeprazole (PRILOSEC) 40 MG DR capsule, Take 40 mg by mouth, Disp: , Rfl:      ondansetron (ZOFRAN-ODT) 8 MG ODT tab, Place 8 mg under the tongue, Disp: , Rfl:      promethazine (PHENERGAN) 12.5 MG tablet, , Disp: , Rfl:      Respiratory Therapy Supplies (CARETOUCH 2 CPAP HOSE ) MISC, CPAP machine for home use at pressure: 7 cm H20, nasal mask x1/3month with nasal cushion x2/mo, Disp: , Rfl:      Rimegepant Sulfate 75 MG TBDP, Take 75 mg by mouth See Admin Instructions Take 75 mg orally per 24 hours placed on or under the tongue; MAX 75 mg/24 hours;, Disp: 8 tablet, Rfl: 9     sertraline (ZOLOFT) 100 MG tablet, Take 1.5 tablets (150 mg) by mouth daily, Disp: 135 tablet, Rfl: 3     sulfamethoxazole-trimethoprim (BACTRIM DS) 800-160 MG tablet, Take 1 tablet by mouth 2 times daily, Disp: 20 tablet, Rfl: 0     tiZANidine (ZANAFLEX) 4 MG tablet, Take 4 mg by mouth as needed, Disp: , Rfl:      traZODone (DESYREL) 100 MG tablet, Take 1 tablet (100 mg) by mouth At Bedtime, Disp: 30 tablet, Rfl: 3     tretinoin (RETIN-A) 0.05 % external cream, Apply 0.05 g topically daily, Disp: , Rfl:      vitamin B-12 (CYANOCOBALAMIN) 500 MCG tablet, Take 500 mcg by mouth daily, Disp: , Rfl:     No family history on file.    Social History     Socioeconomic History     Marital status:      Spouse name: Not on file     Number of children: Not on file     Years of education: Not on file     Highest education level: Not on file   Occupational History     Not on file   Tobacco Use     Smoking status: Never Smoker     Smokeless tobacco: Never Used   Substance and Sexual Activity     Alcohol use: Yes     Drug use: Not Currently      Sexual activity: Not on file   Other Topics Concern     Not on file   Social History Narrative     Not on file     Social Determinants of Health     Financial Resource Strain: Low Risk      Difficulty of Paying Living Expenses: Not hard at all   Food Insecurity: No Food Insecurity     Worried About Running Out of Food in the Last Year: Never true     Ran Out of Food in the Last Year: Never true   Transportation Needs: No Transportation Needs     Lack of Transportation (Medical): No     Lack of Transportation (Non-Medical): No   Physical Activity: Inactive     Days of Exercise per Week: 0 days     Minutes of Exercise per Session: 0 min   Stress: No Stress Concern Present     Feeling of Stress : Only a little   Social Connections: Unknown     Frequency of Communication with Friends and Family: Three times a week     Frequency of Social Gatherings with Friends and Family: More than three times a week     Attends Anabaptist Services: Patient refused     Active Member of Clubs or Organizations: No     Attends Club or Organization Meetings: Not on file     Marital Status:    Intimate Partner Violence: Not on file   Housing Stability: Low Risk      Unable to Pay for Housing in the Last Year: No     Number of Places Lived in the Last Year: 1     Unstable Housing in the Last Year: No       10 point Review of Systems is negative except for left heel pain which is noted in HPI.     /84   Temp 98.5  F (36.9  C)   Wt 118.4 kg (261 lb)   BMI 40.28 kg/m      BMI= Body mass index is 40.28 kg/m .    OBJECTIVE:  General appearance: Patient is alert and fully cooperative with history & exam.  No sign of distress is noted during the visit.    Vascular: Dorsalis pedis palpable left. Mild edema medial left heel.   Dermatologic: Small area of superficial gapping along the central incision medial left heel, localized erythema, no active drainage. Medial left leg incision is well coapted.   Neurologic: All epicritic and  proprioceptive sensations are grossly intact left.  Musculoskeletal: Mild pain plantar left heel.

## 2022-07-26 NOTE — LETTER
7/26/2022         RE: Deepika Oliva  [address] [address]        Dear Colleague,    Thank you for referring your patient, Deepika Oliva, to the [facility]. Please see a copy of my visit note below.        FOOT AND ANKLE SURGERY/PODIATRY PROGRESS NOTE        ASSESSMENT: Cellulitis left foot      TREATMENT:  -There is a small area of superficial gapping along the medial left heel with localized erythema.     -I recommend she reduce walking and will consider use of a CAM boot if the surgical site does not fully heal over the next week.    -I will start her on Bactrim.     -Patient's questions invited and answered. Patient to return to clinic in 1 week(s) for re-evaluation. She was encouraged to call my office with any further questions or concerns.     Juan J Michel DPM  [facility] Podiatry/Foot & Ankle Surgery      HPI: Deepika Oliva was seen again today for left heel drainage and discomfort. She is S/P Plantar Fasciectomy/Gastrocnemius Recession left on 6/2. She has noticed drainage from the left heel with numbness along the heel. Currently walking in gym shoes.     Past Medical History:   Diagnosis Date     Gastroesophageal reflux disease      Migraine      Motion sickness      Other chronic pain      Sleep apnea        Past Surgical History:   Procedure Laterality Date     CHOLECYSTECTOMY       HYSTERECTOMY       MR TEMPOROMANDIBULAR JOINTS       neck fusion       RELEASE PLANTAR FASCIA Left 6/2/2022    Procedure: RELEASE, plantar fascia left foot with gastrocnemius recession.;  Surgeon: Juan J Michel DPM;  Location: [facility]       Allergies   Allergen Reactions     Codeine Hives     Severe Nausea and Vomiting,  Does OK with Norco And Percocet         Current Outpatient Medications:      ALPRAZolam (XANAX) 0.5 MG tablet, , Disp: , Rfl:      ARIPiprazole (ABILIFY) 2 MG tablet, Take 1 tablet (2 mg) by  mouth daily, Disp: 90 tablet, Rfl: 3     bimatoprost (LATISSE) 0.03 % external opthalmic solution, APPLY TO UPPER LASH LINE AT NIGHT, Disp: , Rfl:      celecoxib (CELEBREX) 200 MG capsule, , Disp: , Rfl:      cholecalciferol 25 MCG (1000 UT) TABS, Take 3,000 Units by mouth daily, Disp: , Rfl:      fluticasone (FLONASE) 50 MCG/ACT nasal spray, Spray 2 sprays in nostril daily, Disp: , Rfl:      ketorolac (TORADOL) 10 MG tablet, Take 1 tablet (10 mg) by mouth every 6 hours as needed for moderate pain, Disp: 20 tablet, Rfl: 0     linaclotide (LINZESS) 145 MCG capsule, , Disp: , Rfl:      omeprazole (PRILOSEC) 40 MG DR capsule, Take 40 mg by mouth, Disp: , Rfl:      ondansetron (ZOFRAN-ODT) 8 MG ODT tab, Place 8 mg under the tongue, Disp: , Rfl:      promethazine (PHENERGAN) 12.5 MG tablet, , Disp: , Rfl:      Respiratory Therapy Supplies (CARETOUCH 2 CPAP HOSE ) MISC, CPAP machine for home use at pressure: 7 cm H20, nasal mask x1/3month with nasal cushion x2/mo, Disp: , Rfl:      Rimegepant Sulfate 75 MG TBDP, Take 75 mg by mouth See Admin Instructions Take 75 mg orally per 24 hours placed on or under the tongue; MAX 75 mg/24 hours;, Disp: 8 tablet, Rfl: 9     sertraline (ZOLOFT) 100 MG tablet, Take 1.5 tablets (150 mg) by mouth daily, Disp: 135 tablet, Rfl: 3     sulfamethoxazole-trimethoprim (BACTRIM DS) 800-160 MG tablet, Take 1 tablet by mouth 2 times daily, Disp: 20 tablet, Rfl: 0     tiZANidine (ZANAFLEX) 4 MG tablet, Take 4 mg by mouth as needed, Disp: , Rfl:      traZODone (DESYREL) 100 MG tablet, Take 1 tablet (100 mg) by mouth At Bedtime, Disp: 30 tablet, Rfl: 3     tretinoin (RETIN-A) 0.05 % external cream, Apply 0.05 g topically daily, Disp: , Rfl:      vitamin B-12 (CYANOCOBALAMIN) 500 MCG tablet, Take 500 mcg by mouth daily, Disp: , Rfl:     No family history on file.    Social History     Socioeconomic History     Marital status:      Spouse name: Not on file     Number of children: Not on  file     Years of education: Not on file     Highest education level: Not on file   Occupational History     Not on file   Tobacco Use     Smoking status: Never Smoker     Smokeless tobacco: Never Used   Substance and Sexual Activity     Alcohol use: Yes     Drug use: Not Currently     Sexual activity: Not on file   Other Topics Concern     Not on file   Social History Narrative     Not on file     Social Determinants of Health     Financial Resource Strain: Low Risk      Difficulty of Paying Living Expenses: Not hard at all   Food Insecurity: No Food Insecurity     Worried About Running Out of Food in the Last Year: Never true     Ran Out of Food in the Last Year: Never true   Transportation Needs: No Transportation Needs     Lack of Transportation (Medical): No     Lack of Transportation (Non-Medical): No   Physical Activity: Inactive     Days of Exercise per Week: 0 days     Minutes of Exercise per Session: 0 min   Stress: No Stress Concern Present     Feeling of Stress : Only a little   Social Connections: Unknown     Frequency of Communication with Friends and Family: Three times a week     Frequency of Social Gatherings with Friends and Family: More than three times a week     Attends Orthodoxy Services: Patient refused     Active Member of Clubs or Organizations: No     Attends Club or Organization Meetings: Not on file     Marital Status:    Intimate Partner Violence: Not on file   Housing Stability: Low Risk      Unable to Pay for Housing in the Last Year: No     Number of Places Lived in the Last Year: 1     Unstable Housing in the Last Year: No       10 point Review of Systems is negative except for left heel pain which is noted in HPI.     /84   Temp 98.5  F (36.9  C)   Wt 118.4 kg (261 lb)   BMI 40.28 kg/m      BMI= Body mass index is 40.28 kg/m .    OBJECTIVE:  General appearance: Patient is alert and fully cooperative with history & exam.  No sign of distress is noted during the  visit.    Vascular: Dorsalis pedis palpable left. Mild edema medial left heel.   Dermatologic: Small area of superficial gapping along the central incision medial left heel, localized erythema, no active drainage. Medial left leg incision is well coapted.   Neurologic: All epicritic and proprioceptive sensations are grossly intact left.  Musculoskeletal: Mild pain plantar left heel.       Again, thank you for allowing me to participate in the care of your patient.        Sincerely,        Juan J Michel DPM

## 2022-07-26 NOTE — PATIENT INSTRUCTIONS
Start your antibiotics today.     Cover the opening with a bandaid and change daily. Limited walking on the left foot

## 2022-08-02 ENCOUNTER — OFFICE VISIT (OUTPATIENT)
Dept: PODIATRY | Facility: CLINIC | Age: 47
End: 2022-08-02
Payer: COMMERCIAL

## 2022-08-02 VITALS — DIASTOLIC BLOOD PRESSURE: 86 MMHG | TEMPERATURE: 97.8 F | SYSTOLIC BLOOD PRESSURE: 128 MMHG

## 2022-08-02 DIAGNOSIS — L02.619 CELLULITIS AND ABSCESS OF FOOT, EXCEPT TOES: Primary | ICD-10-CM

## 2022-08-02 DIAGNOSIS — L03.119 CELLULITIS AND ABSCESS OF FOOT, EXCEPT TOES: Primary | ICD-10-CM

## 2022-08-02 PROCEDURE — 99024 POSTOP FOLLOW-UP VISIT: CPT | Performed by: PODIATRIST

## 2022-08-02 NOTE — LETTER
8/2/2022         RE: Deepika Oliva  [address] [address]        Dear Colleague,    Thank you for referring your patient, Deepika Oliva, to the [facility]. Please see a copy of my visit note below.        FOOT AND ANKLE SURGERY/PODIATRY PROGRESS NOTE        ASSESSMENT: Cellulitis left foot      TREATMENT:  -The left heel has improved, no erythema or open lesion noted  Today.     -I recommend she finish the course of bactrim and continue to limit walking x10-14 days. She will continue to monitor for any erythema or concerns.     -Patient's questions invited and answered. She was encouraged to call my office with any further questions or concerns.     Juan J Michel DPM  [facility] Podiatry/Foot & Ankle Surgery      HPI: Deepika Oliva was seen again today for for follow-up left heel infection. The patient states she has not noticed drainage in recent days and has been taking the bactrim as directed.     Past Medical History:   Diagnosis Date     Gastroesophageal reflux disease      Migraine      Motion sickness      Other chronic pain      Sleep apnea        Past Surgical History:   Procedure Laterality Date     CHOLECYSTECTOMY       HYSTERECTOMY       MR TEMPOROMANDIBULAR JOINTS       neck fusion       RELEASE PLANTAR FASCIA Left 6/2/2022    Procedure: RELEASE, plantar fascia left foot with gastrocnemius recession.;  Surgeon: Juan J Michel DPM;  Location: [facility]       Allergies   Allergen Reactions     Codeine Hives     Severe Nausea and Vomiting,  Does OK with Norco And Percocet         Current Outpatient Medications:      ALPRAZolam (XANAX) 0.5 MG tablet, , Disp: , Rfl:      ARIPiprazole (ABILIFY) 2 MG tablet, Take 1 tablet (2 mg) by mouth daily, Disp: 90 tablet, Rfl: 3     bimatoprost (LATISSE) 0.03 % external opthalmic solution, APPLY TO UPPER LASH LINE AT NIGHT, Disp: , Rfl:      celecoxib  (CELEBREX) 200 MG capsule, , Disp: , Rfl:      cholecalciferol 25 MCG (1000 UT) TABS, Take 3,000 Units by mouth daily, Disp: , Rfl:      fluticasone (FLONASE) 50 MCG/ACT nasal spray, Spray 2 sprays in nostril daily, Disp: , Rfl:      ketorolac (TORADOL) 10 MG tablet, Take 1 tablet (10 mg) by mouth every 6 hours as needed for moderate pain, Disp: 20 tablet, Rfl: 0     linaclotide (LINZESS) 145 MCG capsule, , Disp: , Rfl:      omeprazole (PRILOSEC) 40 MG DR capsule, Take 40 mg by mouth, Disp: , Rfl:      ondansetron (ZOFRAN-ODT) 8 MG ODT tab, Place 8 mg under the tongue, Disp: , Rfl:      promethazine (PHENERGAN) 12.5 MG tablet, , Disp: , Rfl:      Respiratory Therapy Supplies (CARETOUCH 2 CPAP HOSE ) MISC, CPAP machine for home use at pressure: 7 cm H20, nasal mask x1/3month with nasal cushion x2/mo, Disp: , Rfl:      Rimegepant Sulfate 75 MG TBDP, Take 75 mg by mouth See Admin Instructions Take 75 mg orally per 24 hours placed on or under the tongue; MAX 75 mg/24 hours;, Disp: 8 tablet, Rfl: 9     sertraline (ZOLOFT) 100 MG tablet, Take 1.5 tablets (150 mg) by mouth daily, Disp: 135 tablet, Rfl: 3     sulfamethoxazole-trimethoprim (BACTRIM DS) 800-160 MG tablet, Take 1 tablet by mouth 2 times daily, Disp: 20 tablet, Rfl: 0     tiZANidine (ZANAFLEX) 4 MG tablet, Take 4 mg by mouth as needed, Disp: , Rfl:      traZODone (DESYREL) 100 MG tablet, Take 1 tablet (100 mg) by mouth At Bedtime, Disp: 30 tablet, Rfl: 3     tretinoin (RETIN-A) 0.05 % external cream, Apply 0.05 g topically daily, Disp: , Rfl:      vitamin B-12 (CYANOCOBALAMIN) 500 MCG tablet, Take 500 mcg by mouth daily, Disp: , Rfl:     No family history on file.    Social History     Socioeconomic History     Marital status:      Spouse name: Not on file     Number of children: Not on file     Years of education: Not on file     Highest education level: Not on file   Occupational History     Not on file   Tobacco Use     Smoking status: Never  Smoker     Smokeless tobacco: Never Used   Substance and Sexual Activity     Alcohol use: Yes     Drug use: Not Currently     Sexual activity: Not on file   Other Topics Concern     Not on file   Social History Narrative     Not on file     Social Determinants of Health     Financial Resource Strain: Low Risk      Difficulty of Paying Living Expenses: Not hard at all   Food Insecurity: No Food Insecurity     Worried About Running Out of Food in the Last Year: Never true     Ran Out of Food in the Last Year: Never true   Transportation Needs: No Transportation Needs     Lack of Transportation (Medical): No     Lack of Transportation (Non-Medical): No   Physical Activity: Inactive     Days of Exercise per Week: 0 days     Minutes of Exercise per Session: 0 min   Stress: No Stress Concern Present     Feeling of Stress : Only a little   Social Connections: Unknown     Frequency of Communication with Friends and Family: Three times a week     Frequency of Social Gatherings with Friends and Family: More than three times a week     Attends Restorationist Services: Patient refused     Active Member of Clubs or Organizations: No     Attends Club or Organization Meetings: Not on file     Marital Status:    Intimate Partner Violence: Not on file   Housing Stability: Low Risk      Unable to Pay for Housing in the Last Year: No     Number of Places Lived in the Last Year: 1     Unstable Housing in the Last Year: No       10 point Review of Systems is negative except for left heel pain which is noted in HPI.     /86   Temp 97.8  F (36.6  C)     BMI= There is no height or weight on file to calculate BMI.    OBJECTIVE:  General appearance: Patient is alert and fully cooperative with history & exam.  No sign of distress is noted during the visit.    Vascular: Dorsalis pedis palpable left. Mild edema medial left heel.   Dermatologic: No gapping along the central incision medial left heel, no erythema, no active drainage.  Medial left leg incision is well coapted.   Neurologic: All epicritic and proprioceptive sensations are grossly intact left.  Musculoskeletal: Mild pain plantar left heel.       Again, thank you for allowing me to participate in the care of your patient.        Sincerely,        Juan J Michel DPM

## 2022-08-02 NOTE — PROGRESS NOTES
FOOT AND ANKLE SURGERY/PODIATRY PROGRESS NOTE        ASSESSMENT: Cellulitis left foot      TREATMENT:  -The left heel has improved, no erythema or open lesion noted  Today.     -I recommend she finish the course of bactrim and continue to limit walking x10-14 days. She will continue to monitor for any erythema or concerns.     -Patient's questions invited and answered. She was encouraged to call my office with any further questions or concerns.     Juan J Michel DPM  [facility] Podiatry/Foot & Ankle Surgery      HPI: Deepika Oliva was seen again today for for follow-up left heel infection. The patient states she has not noticed drainage in recent days and has been taking the bactrim as directed.     Past Medical History:   Diagnosis Date     Gastroesophageal reflux disease      Migraine      Motion sickness      Other chronic pain      Sleep apnea        Past Surgical History:   Procedure Laterality Date     CHOLECYSTECTOMY       HYSTERECTOMY       MR TEMPOROMANDIBULAR JOINTS       neck fusion       RELEASE PLANTAR FASCIA Left 6/2/2022    Procedure: RELEASE, plantar fascia left foot with gastrocnemius recession.;  Surgeon: Juan J Michel DPM;  Location: [facility]       Allergies   Allergen Reactions     Codeine Hives     Severe Nausea and Vomiting,  Does OK with Norco And Percocet         Current Outpatient Medications:      ALPRAZolam (XANAX) 0.5 MG tablet, , Disp: , Rfl:      ARIPiprazole (ABILIFY) 2 MG tablet, Take 1 tablet (2 mg) by mouth daily, Disp: 90 tablet, Rfl: 3     bimatoprost (LATISSE) 0.03 % external opthalmic solution, APPLY TO UPPER LASH LINE AT NIGHT, Disp: , Rfl:      celecoxib (CELEBREX) 200 MG capsule, , Disp: , Rfl:      cholecalciferol 25 MCG (1000 UT) TABS, Take 3,000 Units by mouth daily, Disp: , Rfl:      fluticasone (FLONASE) 50 MCG/ACT nasal spray, Spray 2 sprays in nostril daily, Disp: , Rfl:      ketorolac (TORADOL) 10 MG tablet, Take 1 tablet (10 mg)  by mouth every 6 hours as needed for moderate pain, Disp: 20 tablet, Rfl: 0     linaclotide (LINZESS) 145 MCG capsule, , Disp: , Rfl:      omeprazole (PRILOSEC) 40 MG DR capsule, Take 40 mg by mouth, Disp: , Rfl:      ondansetron (ZOFRAN-ODT) 8 MG ODT tab, Place 8 mg under the tongue, Disp: , Rfl:      promethazine (PHENERGAN) 12.5 MG tablet, , Disp: , Rfl:      Respiratory Therapy Supplies (CARETOUCH 2 CPAP HOSE ) MISC, CPAP machine for home use at pressure: 7 cm H20, nasal mask x1/3month with nasal cushion x2/mo, Disp: , Rfl:      Rimegepant Sulfate 75 MG TBDP, Take 75 mg by mouth See Admin Instructions Take 75 mg orally per 24 hours placed on or under the tongue; MAX 75 mg/24 hours;, Disp: 8 tablet, Rfl: 9     sertraline (ZOLOFT) 100 MG tablet, Take 1.5 tablets (150 mg) by mouth daily, Disp: 135 tablet, Rfl: 3     sulfamethoxazole-trimethoprim (BACTRIM DS) 800-160 MG tablet, Take 1 tablet by mouth 2 times daily, Disp: 20 tablet, Rfl: 0     tiZANidine (ZANAFLEX) 4 MG tablet, Take 4 mg by mouth as needed, Disp: , Rfl:      traZODone (DESYREL) 100 MG tablet, Take 1 tablet (100 mg) by mouth At Bedtime, Disp: 30 tablet, Rfl: 3     tretinoin (RETIN-A) 0.05 % external cream, Apply 0.05 g topically daily, Disp: , Rfl:      vitamin B-12 (CYANOCOBALAMIN) 500 MCG tablet, Take 500 mcg by mouth daily, Disp: , Rfl:     No family history on file.    Social History     Socioeconomic History     Marital status:      Spouse name: Not on file     Number of children: Not on file     Years of education: Not on file     Highest education level: Not on file   Occupational History     Not on file   Tobacco Use     Smoking status: Never Smoker     Smokeless tobacco: Never Used   Substance and Sexual Activity     Alcohol use: Yes     Drug use: Not Currently     Sexual activity: Not on file   Other Topics Concern     Not on file   Social History Narrative     Not on file     Social Determinants of Health     Financial Resource  Strain: Low Risk      Difficulty of Paying Living Expenses: Not hard at all   Food Insecurity: No Food Insecurity     Worried About Running Out of Food in the Last Year: Never true     Ran Out of Food in the Last Year: Never true   Transportation Needs: No Transportation Needs     Lack of Transportation (Medical): No     Lack of Transportation (Non-Medical): No   Physical Activity: Inactive     Days of Exercise per Week: 0 days     Minutes of Exercise per Session: 0 min   Stress: No Stress Concern Present     Feeling of Stress : Only a little   Social Connections: Unknown     Frequency of Communication with Friends and Family: Three times a week     Frequency of Social Gatherings with Friends and Family: More than three times a week     Attends Anabaptism Services: Patient refused     Active Member of Clubs or Organizations: No     Attends Club or Organization Meetings: Not on file     Marital Status:    Intimate Partner Violence: Not on file   Housing Stability: Low Risk      Unable to Pay for Housing in the Last Year: No     Number of Places Lived in the Last Year: 1     Unstable Housing in the Last Year: No       10 point Review of Systems is negative except for left heel pain which is noted in HPI.     /86   Temp 97.8  F (36.6  C)     BMI= There is no height or weight on file to calculate BMI.    OBJECTIVE:  General appearance: Patient is alert and fully cooperative with history & exam.  No sign of distress is noted during the visit.    Vascular: Dorsalis pedis palpable left. Mild edema medial left heel.   Dermatologic: No gapping along the central incision medial left heel, no erythema, no active drainage. Medial left leg incision is well coapted.   Neurologic: All epicritic and proprioceptive sensations are grossly intact left.  Musculoskeletal: Mild pain plantar left heel.
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
## 2022-08-09 ENCOUNTER — OFFICE VISIT (OUTPATIENT)
Dept: SURGERY | Facility: CLINIC | Age: 47
End: 2022-08-09
Attending: INTERNAL MEDICINE
Payer: COMMERCIAL

## 2022-08-09 VITALS
DIASTOLIC BLOOD PRESSURE: 82 MMHG | WEIGHT: 262 LBS | SYSTOLIC BLOOD PRESSURE: 124 MMHG | HEIGHT: 68 IN | BODY MASS INDEX: 39.71 KG/M2

## 2022-08-09 DIAGNOSIS — Z86.69 HISTORY OF MIGRAINE HEADACHES: ICD-10-CM

## 2022-08-09 DIAGNOSIS — K31.84 GASTROPARESIS: ICD-10-CM

## 2022-08-09 DIAGNOSIS — E66.01 MORBID OBESITY (H): Primary | ICD-10-CM

## 2022-08-09 DIAGNOSIS — K21.9 GASTROESOPHAGEAL REFLUX DISEASE WITHOUT ESOPHAGITIS: ICD-10-CM

## 2022-08-09 DIAGNOSIS — R06.83 SNORING: ICD-10-CM

## 2022-08-09 PROCEDURE — 99205 OFFICE O/P NEW HI 60 MIN: CPT | Performed by: FAMILY MEDICINE

## 2022-08-09 RX ORDER — BENZALKONIUM CHLORIDE 1.3 MG/ML
CLOTH TOPICAL
COMMUNITY
Start: 2022-08-08

## 2022-08-09 RX ORDER — PHENTERMINE HYDROCHLORIDE 37.5 MG/1
TABLET ORAL
Qty: 90 TABLET | Refills: 0 | Status: SHIPPED | OUTPATIENT
Start: 2022-08-09 | End: 2022-10-14

## 2022-08-09 NOTE — PATIENT INSTRUCTIONS
Here is the website to go to for the introductory session: [email or web address]         Before being submitted for insurance approval, you will need the following:    -Clearance by the Psychologist  -Clearance by the dietitian  -Routine Health Care Maintenance should be up to date (mammograms yearly after age 50, paps as recommended by your primary provider,  colonoscopy after age 50, earlier if high risk.  -Establish a Primary Care Provider if you do not already have one  -Pre Operative Lab work-ordered today  -Achieve weight loss goals prior to surgery if given  -Structured weight loss visits IF mandated by your insurance carrier  -Surgeon consult  -You will need to be using CPAP for at least one month before surgery if you have sleep apnea. Make sure to bring your CPAP or BiPAP to the hospital at the time of surgery.  -You will need to be tobacco free for 2 months before surgery and remain a non-smoker thereafter. If you are currently smoking or have recently quit, your urine will be evaluated for tobacco metabolites pre-operatively.  -If you are on insulin, you might be referred to an endocrinologist who will manage your insulin during the liquid diet and around the time of surgery. This endocrinologist does not replace your primary provider or your endocrinologist.   -You will need an exercise plan which includes MOVE, ie., walking and MUSCLE, ie.,calisthenics, bands, weight, machines, etc...  ______________________________________________________________________    Remember that after your bariatric surgery, vitamin supplementation is a lifelong need.    You will take:    B-12 1000mcg or higher sublingual (under the tongue) daily or by injection 1-2X/month  D3 5000U daily   Multivitamin containing 18mg of iron twice a day  Calcium citrate 1 or 2 daily  Iron with vitamin C if you are a menstruating female  B Complex 50 mg every day or thiamine 100 mg once a week may be indicated    To keep your weight  off and your vitamin levels up, follow-up is important.    Your labs will be monitored every 6 months for the first two years and yearly thereafter.    To avoid ulcers in your stomach avoid tobacco forever, alcohol in excess, caffeine in excess and anti-inflammatories (NSAIDS)  (Aspirin, Ibuprofen, Naproxen and similar medications). Tylenol is fine.  If you are told by your physician take Aspirin to protect your heart or for another reason, make sure to take omeprazole or similar medication (protonix, nexium, prevacid) to protect your stomach.    Remember that alcohol affects you differently after bariatric surgery. If you have even ONE drink DO NOT DRIVE.      Bariatric Task List    Fax:  Please fax all paperwork to: [phone] -     Status:  Is patient a candidate for bariatric surgery?:  patient is a candidate for bariatric surgery -     Cleared to schedule surgeon consult?:  not cleared to schedule surgeon consult -     Status:  Surgery evaluation in process   Surgeon: Walter Suárez -        Insurance: Insurance:  Preffered One -      Contact insurance to discuss coverage: Needed -       Other:  Call [facility] at [phone] to discuss insurance requirements. -        Patient Info: Initial Weight:  262# -     Date of Initial Weight/Height:  8/9/2022 -     Required Weight Loss:  No gain from initial weight of 262 lb. -     Surgery Type:  undecided -        Dietician Visits: Clearance from dietician to see surgeon?:  Needed - Kaitlynn      Psychological Evaluation: Psych eval:  Completed - Antony-cleared 9/28/2022      Lab Work: Complete Blood Count:  Completed -     Comprehensive Metabolic Panel:  Completed -     Vitamin D:  Completed -     PTH:  Completed -     Hgb A1c:  Completed - 5.3 on 7/22/2022    TSH ([facility], [facility], [facility]): Completed -       Ferritin: Completed -       Folate: Completed -     Thiamine: Completed -     Vitamin A: Completed -     Vitamin B12: Completed -     Zinc: Completed -     Other:   "Completed - routine-completed 8/15/2022      Consults/ Clearance Sleep Medicine:  Completed - Using CPAP-bring to the hospital on the day of surgery.   Medical Weight Management: Needed - Phentermine started.      Testing: Sleep Study: Completed - Uses CPAP x5yrs      Stopping Smoking/ Alcohol Use: Quit tobacco use (3 months smoke free)?:    - Never smoker      Patient Education:  Information Session:  Needed - Must watch videos, see instructions.   Given \"Making your decision\" handout?:  Yes -     Given \"A Roadmap to you Weight Loss Surgery\" handout?: Yes -     Given support group information?:  Send email to [email or web address] to request invite to next meeting.    Attended support group?:  Needed - Must attend at least once!!   Support plan in place?:  Completed - , son, co-worker and daughter      Additional Surgery Requirements: Review Coag plan:  Completed - standard   Birth control plan:  Completed - hysterectomy   Gallstone prevention plan (Actigall for 6 months postop): Completed - cholecystectomy      Final Tasks:  Before surgery online class:  Needed - Prior to surgery date.   After surgery online class:  Needed - 1 week after surgery w/dietitian   History and Physical per clinic:   Needed - within 30 days of surgery.   Final labs per clinic: Needed - within 30 days of surgery.   Chest xray per clinic:  Needed - within 90 days of surgery.   Electrocardiogram (ECG) per clinic:  Needed - within 90 days of surgery.             "
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
## 2022-08-15 ENCOUNTER — LAB (OUTPATIENT)
Dept: LAB | Facility: CLINIC | Age: 47
End: 2022-08-15
Payer: COMMERCIAL

## 2022-08-15 DIAGNOSIS — E66.01 MORBID OBESITY (H): ICD-10-CM

## 2022-08-15 LAB
ALBUMIN SERPL BCG-MCNC: 4.5 G/DL (ref 3.5–5.2)
ALP SERPL-CCNC: 112 U/L (ref 35–104)
ALT SERPL W P-5'-P-CCNC: 22 U/L (ref 10–35)
ANION GAP SERPL CALCULATED.3IONS-SCNC: 14 MMOL/L (ref 7–15)
AST SERPL W P-5'-P-CCNC: 18 U/L (ref 10–35)
BILIRUB SERPL-MCNC: 0.3 MG/DL
BUN SERPL-MCNC: 11.9 MG/DL (ref 6–20)
CALCIUM SERPL-MCNC: 9.6 MG/DL (ref 8.6–10)
CHLORIDE SERPL-SCNC: 105 MMOL/L (ref 98–107)
CREAT SERPL-MCNC: 1.09 MG/DL (ref 0.51–0.95)
DEPRECATED CALCIDIOL+CALCIFEROL SERPL-MC: 79 UG/L (ref 20–75)
DEPRECATED HCO3 PLAS-SCNC: 21 MMOL/L (ref 22–29)
ERYTHROCYTE [DISTWIDTH] IN BLOOD BY AUTOMATED COUNT: 12.7 % (ref 10–15)
FERRITIN SERPL-MCNC: 60 NG/ML (ref 6–175)
FOLATE SERPL-MCNC: 12.4 NG/ML (ref 4.6–34.8)
GFR SERPL CREATININE-BSD FRML MDRD: 62 ML/MIN/1.73M2
GLUCOSE SERPL-MCNC: 94 MG/DL (ref 70–99)
HCT VFR BLD AUTO: 42.3 % (ref 35–47)
HGB BLD-MCNC: 14.1 G/DL (ref 11.7–15.7)
MCH RBC QN AUTO: 29.6 PG (ref 26.5–33)
MCHC RBC AUTO-ENTMCNC: 33.3 G/DL (ref 31.5–36.5)
MCV RBC AUTO: 89 FL (ref 78–100)
PLATELET # BLD AUTO: 318 10E3/UL (ref 150–450)
POTASSIUM SERPL-SCNC: 3.8 MMOL/L (ref 3.4–5.3)
PROT SERPL-MCNC: 7 G/DL (ref 6.4–8.3)
PTH-INTACT SERPL-MCNC: 51 PG/ML (ref 15–65)
RBC # BLD AUTO: 4.76 10E6/UL (ref 3.8–5.2)
SODIUM SERPL-SCNC: 140 MMOL/L (ref 136–145)
TSH SERPL DL<=0.005 MIU/L-ACNC: 2.33 UIU/ML (ref 0.3–4.2)
VIT B12 SERPL-MCNC: 710 PG/ML (ref 232–1245)
WBC # BLD AUTO: 6.1 10E3/UL (ref 4–11)

## 2022-08-15 PROCEDURE — 99000 SPECIMEN HANDLING OFFICE-LAB: CPT

## 2022-08-15 PROCEDURE — 82728 ASSAY OF FERRITIN: CPT

## 2022-08-15 PROCEDURE — 84425 ASSAY OF VITAMIN B-1: CPT | Mod: 90

## 2022-08-15 PROCEDURE — 82306 VITAMIN D 25 HYDROXY: CPT

## 2022-08-15 PROCEDURE — 84590 ASSAY OF VITAMIN A: CPT | Mod: 90

## 2022-08-15 PROCEDURE — 85027 COMPLETE CBC AUTOMATED: CPT

## 2022-08-15 PROCEDURE — 80053 COMPREHEN METABOLIC PANEL: CPT

## 2022-08-15 PROCEDURE — 82746 ASSAY OF FOLIC ACID SERUM: CPT

## 2022-08-15 PROCEDURE — 84443 ASSAY THYROID STIM HORMONE: CPT

## 2022-08-15 PROCEDURE — 84630 ASSAY OF ZINC: CPT | Mod: 90

## 2022-08-15 PROCEDURE — 36415 COLL VENOUS BLD VENIPUNCTURE: CPT

## 2022-08-15 PROCEDURE — 83970 ASSAY OF PARATHORMONE: CPT

## 2022-08-15 PROCEDURE — 82607 VITAMIN B-12: CPT

## 2022-08-17 LAB
ANNOTATION COMMENT IMP: NORMAL
RETINYL PALMITATE SERPL-MCNC: <0.02 MG/L
VIT A SERPL-MCNC: 0.57 MG/L
ZINC SERPL-MCNC: 77.4 UG/DL

## 2022-08-19 LAB — VIT B1 PYROPHOSHATE BLD-SCNC: 137 NMOL/L

## 2022-09-02 ENCOUNTER — VIRTUAL VISIT (OUTPATIENT)
Dept: SURGERY | Facility: CLINIC | Age: 47
End: 2022-09-02
Payer: COMMERCIAL

## 2022-09-02 DIAGNOSIS — K31.84 GASTROPARESIS: ICD-10-CM

## 2022-09-02 DIAGNOSIS — E66.01 CLASS 2 SEVERE OBESITY DUE TO EXCESS CALORIES WITH SERIOUS COMORBIDITY AND BODY MASS INDEX (BMI) OF 39.0 TO 39.9 IN ADULT (H): ICD-10-CM

## 2022-09-02 DIAGNOSIS — E66.812 CLASS 2 SEVERE OBESITY DUE TO EXCESS CALORIES WITH SERIOUS COMORBIDITY AND BODY MASS INDEX (BMI) OF 39.0 TO 39.9 IN ADULT (H): ICD-10-CM

## 2022-09-02 DIAGNOSIS — K21.9 GASTROESOPHAGEAL REFLUX DISEASE WITHOUT ESOPHAGITIS: Primary | ICD-10-CM

## 2022-09-02 DIAGNOSIS — Z71.3 NUTRITIONAL COUNSELING: ICD-10-CM

## 2022-09-02 PROCEDURE — 97802 MEDICAL NUTRITION INDIV IN: CPT | Mod: GT | Performed by: DIETITIAN, REGISTERED

## 2022-09-02 NOTE — LETTER
9/2/2022         RE: Deepika Oliva  [address] [facility] [address]        Dear Colleague,    Thank you for referring your patient, Deepika Oliva, to the [facility]ERY [facility]ATRICS [facility] [place]. Please see a copy of my visit note below.    Deepika Oliva is a 47 year old who is being evaluated via a billable video visit.      How would you like to obtain your AVS? MyChart  If the video visit is dropped, the invitation should be resent by: Send to e-mail at: [email or web address]  Will anyone else be joining your video visit? No      Video Start Time: 10:25 AM      Initial Structured Weight Loss Supervised Diet Evaluation     Assessment:  Deepika is being seen today for initial RD nutritional evaluation. Patient has been unsuccessful with non-surgical weight loss methods and is interested in bariatric surgery. Today we reviewed current eating habits and level of physical activity, and instructed on the changes that are required for successful bariatric outcomes.  Has started on Phentermine per Dr Go.      Surgery of interest per pt: undecided.    Workflow review:  Support Group: Not completed.  Psychology:In progress.  Lab work:Completed.  SWL:Yes 2nd Visit     Weight goal: At or below initial.    Anthropometrics:  Pt's weight is 254.4 lbs   Initial weight: 262 lbs  Weight change: 7.6 lbs (down)  BMI: There is no height or weight on file to calculate BMI.   Ideal body weight: 62.7 kg (138 lb 5.4 oz)  Adjusted ideal body weight: 85.2 kg (187 lb 12.8 oz)    Estimated RMR (Austin-St Jeor equation):  1828 kcals x 1.2 (sedentary) = 2194 kcals (for weight maintenance)    Medical History:  Patient Active Problem List   Diagnosis     Cervical spondylosis with radiculopathy     Chronic constipation     Irritable bowel syndrome     Snoring     Delayed gastric emptying     Depression with anxiety     Gastroesophageal reflux disease without esophagitis      Gastroparesis     Insomnia     Migraine headache     Sacroiliac joint pain     Temporomandibular joint disorder     Vitamin D deficiency     Plantar fasciitis, bilateral     Equinus contracture of ankle     Morbid obesity (H)     Cellulitis and abscess of foot, except toes      Diabetes: No   HbA1c:  No results found for: HGBA1C    Nutrition History  Food allergies/intolerances/cultural or religous food customs: No     Vitamins/Mineral currently taking: MVI with Iron     Socioeconomic Status  Who does the grocery shopping for your household? Self     Who prepares your meals at home? Self     Diet Recall/Time  Breakfast: granola bar, Diet Coke   Lunch: grilled chicken or Healthy Choice meal, fruit (melon) or yogurt or cottage cheese   Dinner: typically eating out     Typical Snacks: reduced more recently    Overnight eating: No    Eating out: 3 times/week     Beverages  Iced Coffee (recently eliminated), Bubbly, Diet Coke 2 times/day, 32 oz water     Exercise  No set regimen-due to foot surgery    Nutrition Diagnosis (PES statement)  Overweight/Obesity (NC 3.3) related to overeating and poor lifestyle habits as evidenced by patient's subjective statements (excessive energy intake, lack of exercise) and BMI of 39.3 kg/m2.     Intervention    Nutrition Education:   1. Provided general overview of diet and lifestyle modifications needed to be a deemed a safe candidate for bariatric surgery.   2. Educated patient on how to read a food label: choosing foods with than 10 grams fat and 10 grams sugar per serving to avoid dumping syndrome.  3. Dumping Syndrome: Described the mechanisms of syndrome, symptoms, and prevention tools from a dietary perspective.   4. Vitamins: Educated on post-op vitamin regimen including MVI+ 18 mg Fe two times a day, calcium citrate 400-600 mg two times a day, 7347-9529 mcg sublingual B12 daily, 5000 IU vitamin D3 daily.     Food/Nutrient Delivery:  5. Educated patient on eating three meals,  with cutting out snacking.  6. Bariatric Plate: Patient and I discussed the importance of including a lean protein source (20-30 grams/meal), vegetables (included at lunch and dinner), one serving (15g) of carbohydrate, and limited added fat (1 tb/day) at each meal.   7. Educated patient on using a protein powder drink as a meal replacement and/or supplement after bariatric surgery.   8. Discussed importance of adequate hydration after surgery, with goal of at least 64 oz of fluids/day.  9. Addressed avoiding all carbonated, caffeinated and sweetened drinks to prepare for bariatric surgery.     Nutrition Counseling:  10. Mindful eating techniques: Encouraged slow meal pace, chewing foods to applesauce consistency for 20-30 minutes/meal.   11. Discussed  fluids 30 minutes before, during, and after meal to prevent dumping syndrome and discomfort post bariatric surgery.     Instructions/Goals:     1. Start implementing bariatric surgery lifestyle modifications.    Handouts Provided:   [facility] Bariatric Surgery Nutrition Info      Monitor/Evaluation:  Pt. s target weight: no gain from initial visit, patient verbalized understanding.     Plan for next visit:     (Final Supervised Diet visit with RD) pre/post-op  diet progression, give review of surgery process.      Video-Visit Details    Type of service:  Video Visit    Video End Time:10:56 AM    Originating Location (pt. Location): Home    Distant Location (provider location):  [facility] [place]     Platform used for Video Visit: Josie Alas RD        Again, thank you for allowing me to participate in the care of your patient.        Sincerely,        Kaitlynn Alas RD

## 2022-09-02 NOTE — PROGRESS NOTES
Deepika Oliva is a 47 year old who is being evaluated via a billable video visit.      How would you like to obtain your AVS? MyChart  If the video visit is dropped, the invitation should be resent by: Send to e-mail at: [email or web address]  Will anyone else be joining your video visit? No      Video Start Time: 10:25 AM      Initial Structured Weight Loss Supervised Diet Evaluation     Assessment:  Deepika is being seen today for initial RD nutritional evaluation. Patient has been unsuccessful with non-surgical weight loss methods and is interested in bariatric surgery. Today we reviewed current eating habits and level of physical activity, and instructed on the changes that are required for successful bariatric outcomes.  Has started on Phentermine per Dr Go.      Surgery of interest per pt: undecided.    Workflow review:  Support Group: Not completed.  Psychology:In progress.  Lab work:Completed.  SWL:Yes 2nd Visit     Weight goal: At or below initial.    Anthropometrics:  Pt's weight is 254.4 lbs   Initial weight: 262 lbs  Weight change: 7.6 lbs (down)  BMI: There is no height or weight on file to calculate BMI.   Ideal body weight: 62.7 kg (138 lb 5.4 oz)  Adjusted ideal body weight: 85.2 kg (187 lb 12.8 oz)    Estimated RMR (Daniels-St Jeor equation):  1828 kcals x 1.2 (sedentary) = 2194 kcals (for weight maintenance)    Medical History:  Patient Active Problem List   Diagnosis     Cervical spondylosis with radiculopathy     Chronic constipation     Irritable bowel syndrome     Snoring     Delayed gastric emptying     Depression with anxiety     Gastroesophageal reflux disease without esophagitis     Gastroparesis     Insomnia     Migraine headache     Sacroiliac joint pain     Temporomandibular joint disorder     Vitamin D deficiency     Plantar fasciitis, bilateral     Equinus contracture of ankle     Morbid obesity (H)     Cellulitis and abscess of foot, except toes      Diabetes: No    HbA1c:  No results found for: HGBA1C    Nutrition History  Food allergies/intolerances/cultural or religous food customs: No     Vitamins/Mineral currently taking: MVI with Iron     Socioeconomic Status  Who does the grocery shopping for your household? Self     Who prepares your meals at home? Self     Diet Recall/Time  Breakfast: granola bar, Diet Coke   Lunch: grilled chicken or Healthy Choice meal, fruit (melon) or yogurt or cottage cheese   Dinner: typically eating out     Typical Snacks: reduced more recently    Overnight eating: No    Eating out: 3 times/week     Beverages  Iced Coffee (recently eliminated), Bubbly, Diet Coke 2 times/day, 32 oz water     Exercise  No set regimen-due to foot surgery    Nutrition Diagnosis (PES statement)  Overweight/Obesity (NC 3.3) related to overeating and poor lifestyle habits as evidenced by patient's subjective statements (excessive energy intake, lack of exercise) and BMI of 39.3 kg/m2.     Intervention    Nutrition Education:   1. Provided general overview of diet and lifestyle modifications needed to be a deemed a safe candidate for bariatric surgery.   2. Educated patient on how to read a food label: choosing foods with than 10 grams fat and 10 grams sugar per serving to avoid dumping syndrome.  3. Dumping Syndrome: Described the mechanisms of syndrome, symptoms, and prevention tools from a dietary perspective.   4. Vitamins: Educated on post-op vitamin regimen including MVI+ 18 mg Fe two times a day, calcium citrate 400-600 mg two times a day, 2529-2131 mcg sublingual B12 daily, 5000 IU vitamin D3 daily.     Food/Nutrient Delivery:  5. Educated patient on eating three meals, with cutting out snacking.  6. Bariatric Plate: Patient and I discussed the importance of including a lean protein source (20-30 grams/meal), vegetables (included at lunch and dinner), one serving (15g) of carbohydrate, and limited added fat (1 tb/day) at each meal.   7. Educated patient on  using a protein powder drink as a meal replacement and/or supplement after bariatric surgery.   8. Discussed importance of adequate hydration after surgery, with goal of at least 64 oz of fluids/day.  9. Addressed avoiding all carbonated, caffeinated and sweetened drinks to prepare for bariatric surgery.     Nutrition Counseling:  10. Mindful eating techniques: Encouraged slow meal pace, chewing foods to applesauce consistency for 20-30 minutes/meal.   11. Discussed  fluids 30 minutes before, during, and after meal to prevent dumping syndrome and discomfort post bariatric surgery.     Instructions/Goals:     1. Start implementing bariatric surgery lifestyle modifications.    Handouts Provided:   [facility] Bariatric Surgery Nutrition Info      Monitor/Evaluation:  Pt. s target weight: no gain from initial visit, patient verbalized understanding.     Plan for next visit:     (Final Supervised Diet visit with RD) pre/post-op  diet progression, give review of surgery process.      Video-Visit Details    Type of service:  Video Visit    Video End Time:10:56 AM    Originating Location (pt. Location): Home    Distant Location (provider location):  [facility]GERY CL[facility]ATRICS [facility] [place]     Platform used for Video Visit: Josie Alas RD

## 2022-09-12 ENCOUNTER — VIRTUAL VISIT (OUTPATIENT)
Dept: SURGERY | Facility: CLINIC | Age: 47
End: 2022-09-12

## 2022-09-12 DIAGNOSIS — E66.01 MORBID OBESITY (H): Primary | ICD-10-CM

## 2022-09-12 NOTE — PROGRESS NOTES
Video-Visit Details    Type of service:  Video Visit    Video Start Time (time video started): 3:25 PM    Video End Time (time video stopped): 4:10 PM    Originating Location (pt. Location): Home    Distant Location (provider location):  Home office     Mode of Communication:  Video Conference via Zoom for Healthcare    Physician has received verbal consent for a Video Visit from the patient? Yes    Deepika would like to follow through with bariatric surgery for health reasons. She has struggled with her weight for much of her life and now is concerned about various comorbidities. She has a history of depression and anxiety for which she is medicated. She has a history of stress, emotional and boredom eating. She has good knowledge of surgery and good support. She will follow up and complete psychological testing. F32.9; F41.9; E66.01    Antony Zamarripa, PhD

## 2022-09-20 ENCOUNTER — MYC MEDICAL ADVICE (OUTPATIENT)
Dept: FAMILY MEDICINE | Facility: CLINIC | Age: 47
End: 2022-09-20

## 2022-09-20 DIAGNOSIS — Z11.1 ENCOUNTER FOR TB TINE TEST: Primary | ICD-10-CM
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
## 2022-09-21 ENCOUNTER — LAB (OUTPATIENT)
Dept: LAB | Facility: CLINIC | Age: 47
End: 2022-09-21
Payer: COMMERCIAL

## 2022-09-21 DIAGNOSIS — Z11.1 ENCOUNTER FOR TB TINE TEST: ICD-10-CM

## 2022-09-21 PROCEDURE — 36415 COLL VENOUS BLD VENIPUNCTURE: CPT

## 2022-09-21 PROCEDURE — 86481 TB AG RESPONSE T-CELL SUSP: CPT

## 2022-09-23 LAB
QUANTIFERON MITOGEN: 10 IU/ML
QUANTIFERON NIL TUBE: 0.08 IU/ML
QUANTIFERON TB1 TUBE: 0.07 IU/ML
QUANTIFERON TB2 TUBE: 0.07

## 2022-09-26 PROBLEM — L03.119 CELLULITIS AND ABSCESS OF FOOT, EXCEPT TOES: Status: RESOLVED | Noted: 2022-07-26 | Resolved: 2022-09-26

## 2022-09-26 PROBLEM — L02.619 CELLULITIS AND ABSCESS OF FOOT, EXCEPT TOES: Status: RESOLVED | Noted: 2022-07-26 | Resolved: 2022-09-26

## 2022-09-26 LAB
GAMMA INTERFERON BACKGROUND BLD IA-ACNC: 0.08 IU/ML
M TB IFN-G BLD-IMP: NEGATIVE
M TB IFN-G CD4+ BCKGRND COR BLD-ACNC: 9.92 IU/ML
MITOGEN IGNF BCKGRD COR BLD-ACNC: -0.01 IU/ML
MITOGEN IGNF BCKGRD COR BLD-ACNC: -0.01 IU/ML

## 2022-09-27 ENCOUNTER — OFFICE VISIT (OUTPATIENT)
Dept: SURGERY | Facility: CLINIC | Age: 47
End: 2022-09-27
Payer: COMMERCIAL

## 2022-09-27 VITALS
WEIGHT: 248 LBS | BODY MASS INDEX: 37.59 KG/M2 | HEIGHT: 68 IN | SYSTOLIC BLOOD PRESSURE: 118 MMHG | DIASTOLIC BLOOD PRESSURE: 80 MMHG

## 2022-09-27 DIAGNOSIS — Z86.69 HISTORY OF MIGRAINE HEADACHES: ICD-10-CM

## 2022-09-27 DIAGNOSIS — K21.9 GASTROESOPHAGEAL REFLUX DISEASE WITHOUT ESOPHAGITIS: ICD-10-CM

## 2022-09-27 DIAGNOSIS — E66.01 MORBID OBESITY (H): Primary | ICD-10-CM

## 2022-09-27 PROCEDURE — 99214 OFFICE O/P EST MOD 30 MIN: CPT | Performed by: FAMILY MEDICINE

## 2022-09-27 NOTE — PATIENT INSTRUCTIONS
Here are the [employer] exercise sites:    Yoga-https://www.Esanex.com/user/yogawithadriene    Body strength activities, dance, high intensity interval training- https://www.Esanex.com/user/popsugartvfit    Strength training- https://www.Esanex.Zachary Prell/user/KozakSportsPerform    Walking- https://www.Esanex.com/user/walkathomemedia    Sit and Be Fit- https://www.Esanex.Zachary Prell/channel/UCLgvL3aGzMByecNYtMcyK_g    Low impact cardio- Seda Lugo- https://www.Esanex.com/channel/XKtwPWzQkgsUqubA0jmyaxkS    Cardio Holly Roman- https://www.Esanex.com/channel/AKLsWula5WNyLMKHM3dUhOPn    30 Min low impact cardio- https://www.go2 mediaube.com/watch?v=gC_L9qAHVJ8    Body Project- https://www.Esanex.Zachary Prell/channel/XDJrh4J04-JwEwZHwUnmI6TT
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
## 2022-09-28 ENCOUNTER — VIRTUAL VISIT (OUTPATIENT)
Dept: SURGERY | Facility: CLINIC | Age: 47
End: 2022-09-28

## 2022-09-28 ENCOUNTER — TRANSFERRED RECORDS (OUTPATIENT)
Dept: HEALTH INFORMATION MANAGEMENT | Facility: CLINIC | Age: 47
End: 2022-09-28

## 2022-09-28 DIAGNOSIS — E66.01 MORBID OBESITY (H): Primary | ICD-10-CM

## 2022-09-28 NOTE — LETTER
9/28/2022         RE: Deepika Oliva  [address] [address]        Dear Colleague,    Thank you for referring your patient, Deepika Oliva, to the [facility] CL[facility]IC AND [facility]IATRICS CARE [place]. Please see a copy of my visit note below.    Video-Visit Details    Type of service:  Video Visit    Video Start Time (time video started): 3:20 PM    Video End Time (time video stopped): 3:58 PM    Originating Location (pt. Location): Home    Distant Location (provider location):  Home office    Mode of Communication:  Video Conference via [facility]    Physician has received verbal consent for a Video Visit from the patient? Yes    Phuong made quality changes in eating and lifestyle and is now 100% certain she wants to have surgery. Because she just started a new job, she will likely hold off surgery until the summer. But, from my standpoint, she is ready to proceed with surgery. A report was sent to the clinic. F41.9; F32.9; E66.01    Antony Zamarripa, PhD            Again, thank you for allowing me to participate in the care of your patient.        Sincerely,        Antony Zamarripa, PhD

## 2022-09-28 NOTE — PROGRESS NOTES
Video-Visit Details    Type of service:  Video Visit    Video Start Time (time video started): 3:20 PM    Video End Time (time video stopped): 3:58 PM    Originating Location (pt. Location): Home    Distant Location (provider location):  Home office    Mode of Communication:  Video Conference via [employer]    Physician has received verbal consent for a Video Visit from the patient? Yes    Phuong made quality changes in eating and lifestyle and is now 100% certain she wants to have surgery. Because she just started a new job, she will likely hold off surgery until the summer. But, from my standpoint, she is ready to proceed with surgery. A report was sent to the clinic. F41.9; F32.9; E66.01    Antony Zamarripa, PhD

## 2022-10-06 DIAGNOSIS — G43.109 MIGRAINE WITH AURA AND WITHOUT STATUS MIGRAINOSUS, NOT INTRACTABLE: Primary | ICD-10-CM

## 2022-10-06 RX ORDER — KETOROLAC TROMETHAMINE 30 MG/ML
30 INJECTION, SOLUTION INTRAMUSCULAR; INTRAVENOUS ONCE
Status: DISCONTINUED | OUTPATIENT
Start: 2022-10-06 | End: 2022-12-02 | Stop reason: CLARIF

## 2022-10-06 RX ORDER — NARATRIPTAN 2.5 MG/1
2.5 TABLET ORAL
Qty: 18 TABLET | Refills: 11 | Status: SHIPPED | OUTPATIENT
Start: 2022-10-06 | End: 2022-12-02

## 2022-10-07 DIAGNOSIS — G43.109 MIGRAINE WITH AURA AND WITHOUT STATUS MIGRAINOSUS, NOT INTRACTABLE: ICD-10-CM

## 2022-10-07 RX ORDER — KETOROLAC TROMETHAMINE 10 MG/1
10 TABLET, FILM COATED ORAL EVERY 6 HOURS PRN
Qty: 20 TABLET | Refills: 0 | Status: SHIPPED | OUTPATIENT
Start: 2022-10-07 | End: 2023-02-09

## 2022-10-10 ENCOUNTER — TELEPHONE (OUTPATIENT)
Dept: SURGERY | Facility: CLINIC | Age: 47
End: 2022-10-10

## 2022-10-10 NOTE — TELEPHONE ENCOUNTER
Prior Authorization Retail Medication Request    Medication/Dose: Phentermine 37.5mg tablets    Insurance Name:  [employer]  Insurance ID:  [id number]    Pharmacy Information (if different than what is on RX)  Name:  [facility], [place]  Phone:  [phone]

## 2022-10-11 NOTE — TELEPHONE ENCOUNTER
Central Prior Authorization Team  Phone: [phone]    PA Initiation    Medication: Phentermine 37.5mg tablets  Insurance Company: [employer] - Phone [phone] Fax [phone]  Pharmacy Filling the Rx: [id number] - [place], [place] - [address] AT [facility] [address]  Filling Pharmacy Phone: [phone]  Filling Pharmacy Fax:    Start Date: 10/11/2022

## 2022-10-14 NOTE — TELEPHONE ENCOUNTER
Central Prior Authorization Team  Phone: [phone]    PRIOR AUTHORIZATION DENIED    Medication: Phentermine 37.5mg tablets    Denial Date: 10/14/2022     This drug is excluded from the patient's benefit. No pa is able to be done.

## 2022-10-18 ENCOUNTER — VIRTUAL VISIT (OUTPATIENT)
Dept: SURGERY | Facility: CLINIC | Age: 47
End: 2022-10-18
Payer: COMMERCIAL

## 2022-10-18 DIAGNOSIS — K31.84 GASTROPARESIS: ICD-10-CM

## 2022-10-18 DIAGNOSIS — E66.01 CLASS 2 SEVERE OBESITY DUE TO EXCESS CALORIES WITH SERIOUS COMORBIDITY AND BODY MASS INDEX (BMI) OF 38.0 TO 38.9 IN ADULT (H): ICD-10-CM

## 2022-10-18 DIAGNOSIS — Z71.3 NUTRITIONAL COUNSELING: ICD-10-CM

## 2022-10-18 DIAGNOSIS — K21.9 GASTROESOPHAGEAL REFLUX DISEASE WITHOUT ESOPHAGITIS: Primary | ICD-10-CM

## 2022-10-18 DIAGNOSIS — E66.812 CLASS 2 SEVERE OBESITY DUE TO EXCESS CALORIES WITH SERIOUS COMORBIDITY AND BODY MASS INDEX (BMI) OF 38.0 TO 38.9 IN ADULT (H): ICD-10-CM

## 2022-10-18 PROCEDURE — 97803 MED NUTRITION INDIV SUBSEQ: CPT | Mod: GT | Performed by: DIETITIAN, REGISTERED

## 2022-10-18 NOTE — LETTER
10/18/2022         RE: Deepika Oliva  [address] [address]        Dear Colleague,    Thank you for referring your patient, Deepika Oliva, to the [facility] AND BARIATRICS [facility] [place]. Please see a copy of my visit note below.    Deepika Oliva is a 47 year old who is being evaluated via a billable video visit.    How would you like to obtain your AVS? MyChart  If the video visit is dropped, the invitation should be resent by: Send to e-mail at: [email or web address]  Will anyone else be joining your video visit? No      Video Start Time: 1:58 PM      Follow Up Surgical Weight Loss Supervised Diet Evaluation    Assessment:  Pt. is being seen today for a follow up RD nutritional evaluation. Pt. has been unsuccessful with non-surgical weight loss methods and is interested in bariatric surgery. Today we reviewed current eating habits and level of physical activity, and instructed on the changes that are required for successful bariatric outcomes. Patient is also taking Phentermine to help with weight management.     Surgery of interest per pt: Undecided.    Workflow review:  Support Group: Not Completed .  Psychology:Completed.  Lab work:Completed.  SWL:Yes 3rd Visit      Weight goal: At or below initial.    Anthropometrics:  Pt's weight is 248 lbs   Initial weight: 262 lbs  Weight change: 14 lbs (down)   BMI: There is no height or weight on file to calculate BMI.   Ideal body weight: 62.7 kg (138 lb 5.4 oz)  Adjusted ideal body weight: 82.6 kg (182 lb 3.3 oz)    Estimated RMR (Umpire-St Jeor equation):  1799 kcals x 1.3 (light active) = 2339 kcals (for weight maintenance)    Medical History:  Patient Active Problem List   Diagnosis     Cervical spondylosis with radiculopathy     Chronic constipation     Irritable bowel syndrome     Snoring     Delayed gastric emptying     Depression with anxiety     Gastroesophageal reflux disease  without esophagitis     Gastroparesis     Insomnia     Migraine headache     Sacroiliac joint pain     Temporomandibular joint disorder     Vitamin D deficiency     Plantar fasciitis, bilateral     Equinus contracture of ankle     Morbid obesity (H)      Diabetes: No   HbA1c:  No results found for: HGBA1C    Progress over past month: Has been focusing on increased protein consumption along with focusing on protein first.  Did note that her labs values indicated that she has had some kidney issues, however has not been officially started on any particular dietary recommendations.     Diet Recall/Time  Breakfast: Protein Shake (20 grams), yogurt  Lunch: turkey breast, string cheese or cheddar cheese or cottage cheese, fruit   Dinner: chicken with vegetables (green beans) or soup (butternut squash) or Protein Bar    Typical Snacks: None    Eating out: has been working on reducing    Meal duration: has been working on.      fluids by 30 minutes before, during meal, and waiting 30 minutes after meal before drinking fluids: No-struggling with     Beverages  Water (aiming towards 64 oz/day), Unsweetened Ice Tea, Has eliminated Diet Coke this past month    Exercise  Walking some     PES statement:   Overweight/Obesity (NC 3.3) related to overeating and poor lifestyle habits as evidenced by patient's subjective statements (h/o excessive energy intake) and BMI of 38.27 kg/m2.     Intervention    Nutrition Education:   1. Provided general overview of diet and lifestyle modifications needed to be a deemed a safe candidate for bariatric surgery.   Food/Nutrient Delivery:  2. Educated patient on eating three meals, with cutting out snacking.  3. Bariatric Plate: Patient and I discussed the importance of including a lean protein source (20-30 grams/meal), vegetables (included at lunch and dinner), one serving (15g) of carbohydrate, and limited added fat (1 tb/day) at each meal.   4. Educated patient on using a protein  powder drink as a meal replacement and/or supplement after bariatric surgery.   5. Discussed importance of adequate hydration after surgery, with goal of at least 64 oz of fluids/day.  6. Addressed avoiding all carbonated, caffeinated and sweetened drinks to prepare for bariatric surgery.     Nutrition Counselin. Mindful eating techniques: Encouraged slow meal pace, chewing foods to applesauce consistency for 20-30 minutes/meal.   8. Discussed  fluids 30 minutes before, during, and after meal to prevent dumping syndrome and discomfort post bariatric surgery.     Instructions/Goals:     1. Continue implementing bariatric surgery lifestyle modifications.      Handouts Provided:   [facility] Bariatric Surgery Nutrition Info    Monitor/Evaluation:  Pt. s target weight:  no gain from initial visit, pt. verbalized understanding.     Plan for next visit:     (Final supervised diet visit with RD) pre/post-op  diet progression, give review of surgery process.      Video-Visit Details    Type of service:  Video Visit    Video End Time:2:14 PM    Originating Location (pt. Location): Home    Distant Location (provider location):  [facility] CL[facility] AND [facility]ATRICS CARE [place]     Platform used for Video Visit: Josie Alas RD          Again, thank you for allowing me to participate in the care of your patient.        Sincerely,        Kaitlynn Alas RD

## 2022-10-18 NOTE — PROGRESS NOTES
Deepika Oliva is a 47 year old who is being evaluated via a billable video visit.    How would you like to obtain your AVS? MyChart  If the video visit is dropped, the invitation should be resent by: Send to e-mail at: [email or web address]  Will anyone else be joining your video visit? No      Video Start Time: 1:58 PM      Follow Up Surgical Weight Loss Supervised Diet Evaluation    Assessment:  Pt. is being seen today for a follow up RD nutritional evaluation. Pt. has been unsuccessful with non-surgical weight loss methods and is interested in bariatric surgery. Today we reviewed current eating habits and level of physical activity, and instructed on the changes that are required for successful bariatric outcomes. Patient is also taking Phentermine to help with weight management.     Surgery of interest per pt: Undecided.    Workflow review:  Support Group: Not Completed .  Psychology:Completed.  Lab work:Completed.  SWL:Yes 3rd Visit      Weight goal: At or below initial.    Anthropometrics:  Pt's weight is 248 lbs   Initial weight: 262 lbs  Weight change: 14 lbs (down)   BMI: There is no height or weight on file to calculate BMI.   Ideal body weight: 62.7 kg (138 lb 5.4 oz)  Adjusted ideal body weight: 82.6 kg (182 lb 3.3 oz)    Estimated RMR (Briscoe-St Jeor equation):  1799 kcals x 1.3 (light active) = 2339 kcals (for weight maintenance)    Medical History:  Patient Active Problem List   Diagnosis     Cervical spondylosis with radiculopathy     Chronic constipation     Irritable bowel syndrome     Snoring     Delayed gastric emptying     Depression with anxiety     Gastroesophageal reflux disease without esophagitis     Gastroparesis     Insomnia     Migraine headache     Sacroiliac joint pain     Temporomandibular joint disorder     Vitamin D deficiency     Plantar fasciitis, bilateral     Equinus contracture of ankle     Morbid obesity (H)      Diabetes: No   HbA1c:  No results found for:  HGBA1C    Progress over past month: Has been focusing on increased protein consumption along with focusing on protein first.  Did note that her labs values indicated that she has had some kidney issues, however has not been officially started on any particular dietary recommendations.     Diet Recall/Time  Breakfast: Protein Shake (20 grams), yogurt  Lunch: turkey breast, string cheese or cheddar cheese or cottage cheese, fruit   Dinner: chicken with vegetables (green beans) or soup (butternut squash) or Protein Bar    Typical Snacks: None    Eating out: has been working on reducing    Meal duration: has been working on.      fluids by 30 minutes before, during meal, and waiting 30 minutes after meal before drinking fluids: No-struggling with     Beverages  Water (aiming towards 64 oz/day), Unsweetened Ice Tea, Has eliminated Diet Coke this past month    Exercise  Walking some     PES statement:   Overweight/Obesity (NC 3.3) related to overeating and poor lifestyle habits as evidenced by patient's subjective statements (h/o excessive energy intake) and BMI of 38.27 kg/m2.     Intervention    Nutrition Education:   1. Provided general overview of diet and lifestyle modifications needed to be a deemed a safe candidate for bariatric surgery.   Food/Nutrient Delivery:  2. Educated patient on eating three meals, with cutting out snacking.  3. Bariatric Plate: Patient and I discussed the importance of including a lean protein source (20-30 grams/meal), vegetables (included at lunch and dinner), one serving (15g) of carbohydrate, and limited added fat (1 tb/day) at each meal.   4. Educated patient on using a protein powder drink as a meal replacement and/or supplement after bariatric surgery.   5. Discussed importance of adequate hydration after surgery, with goal of at least 64 oz of fluids/day.  6. Addressed avoiding all carbonated, caffeinated and sweetened drinks to prepare for bariatric surgery.      Nutrition Counselin. Mindful eating techniques: Encouraged slow meal pace, chewing foods to applesauce consistency for 20-30 minutes/meal.   8. Discussed  fluids 30 minutes before, during, and after meal to prevent dumping syndrome and discomfort post bariatric surgery.     Instructions/Goals:     1. Continue implementing bariatric surgery lifestyle modifications.      Handouts Provided:   [facility] Bariatric Surgery Nutrition Info    Monitor/Evaluation:  Pt. s target weight:  no gain from initial visit, pt. verbalized understanding.     Plan for next visit:     (Final supervised diet visit with RD) pre/post-op  diet progression, give review of surgery process.      Video-Visit Details    Type of service:  Video Visit    Video End Time:2:14 PM    Originating Location (pt. Location): Home    Distant Location (provider location):  [facility] [place]     Platform used for Video Visit: Josie Alas RD

## 2022-11-21 DIAGNOSIS — G43.709 CHRONIC MIGRAINE WITHOUT AURA WITHOUT STATUS MIGRAINOSUS, NOT INTRACTABLE: Primary | ICD-10-CM

## 2022-11-21 RX ORDER — TOPIRAMATE 25 MG/1
TABLET, FILM COATED ORAL
Qty: 120 TABLET | Refills: 5 | Status: SHIPPED | OUTPATIENT
Start: 2022-11-21 | End: 2023-05-17

## 2022-12-01 ASSESSMENT — MIGRAINE DISABILITY ASSESSMENT (MIDAS)
HOW MANY DAYS WAS HOUSEWORK PRODUCTIVITY CUT IN HALF DUE TO HEADACHES: 45
ON A SCALE FROM 0-10 ON AVERAGE HOW PAINFUL WERE HEADACHES: 6
TOTAL SCORE: 135
HOW MANY DAYS DID YOU MISS WORK OR SCHOOL BECAUSE OF HEADACHES: 30
HOW OFTEN WERE SOCIAL ACTIVITIES MISSED DUE TO HEADACHES: 10
HOW MANY DAYS IN THE PAST 3 MONTHS HAVE YOU HAD A HEADACHE: 45
HOW MANY DAYS DID YOU NOT DO HOUSEWORK BECAUSE OF HEADACHES: 30
HOW MANY DAYS WAS YOUR PRODUCTIVITY CUT IN HALF BECAUSE OF HEADACHES: 20

## 2022-12-01 ASSESSMENT — HEADACHE IMPACT TEST (HIT 6)
HOW OFTEN DO HEADACHES LIMIT YOUR DAILY ACTIVITIES: VERY OFTEN
HOW OFTEN HAVE YOU FELT FED UP OR IRRITATED BECAUSE OF YOUR HEADACHES: VERY OFTEN
HOW OFTEN DID HEADACHS LIMIT CONCENTRATION ON WORK OR DAILY ACTIVITY: VERY OFTEN
HIT6 TOTAL SCORE: 66
WHEN YOU HAVE HEADACHES HOW OFTEN IS THE PAIN SEVERE: VERY OFTEN
HOW OFTEN HAVE YOU FELT TOO TIRED TO WORK BECAUSE OF YOUR HEADACHES: VERY OFTEN
WHEN YOU HAVE A HEADACHE HOW OFTEN DO YOU WISH YOU COULD LIE DOWN: VERY OFTEN

## 2022-12-02 ENCOUNTER — VIRTUAL VISIT (OUTPATIENT)
Dept: NEUROLOGY | Facility: CLINIC | Age: 47
End: 2022-12-02
Payer: COMMERCIAL

## 2022-12-02 DIAGNOSIS — R51.9 WORSENING HEADACHES: Primary | ICD-10-CM

## 2022-12-02 PROCEDURE — 99213 OFFICE O/P EST LOW 20 MIN: CPT | Mod: GT | Performed by: NURSE PRACTITIONER

## 2022-12-02 RX ORDER — HYDROXYCHLOROQUINE SULFATE 200 MG/1
200 TABLET, FILM COATED ORAL DAILY
COMMUNITY
Start: 2022-11-22 | End: 2023-10-19

## 2022-12-02 RX ORDER — RIZATRIPTAN BENZOATE 5 MG/1
5 TABLET, ORALLY DISINTEGRATING ORAL PRN
COMMUNITY
Start: 2022-11-21 | End: 2023-04-19

## 2022-12-02 RX ORDER — BUSPIRONE HYDROCHLORIDE 5 MG/1
5 TABLET ORAL DAILY
COMMUNITY
Start: 2022-10-12 | End: 2022-12-19

## 2022-12-02 RX ORDER — ZOLPIDEM TARTRATE 5 MG/1
5 TABLET ORAL PRN
COMMUNITY
Start: 2022-11-22 | End: 2023-10-19

## 2022-12-02 RX ORDER — DULOXETIN HYDROCHLORIDE 60 MG/1
60 CAPSULE, DELAYED RELEASE ORAL DAILY
COMMUNITY
Start: 2022-11-22

## 2022-12-02 NOTE — PROGRESS NOTES
Deepika is a 47 year old who is being evaluated via a billable video visit.      How would you like to obtain your AVS? MyChart  If the video visit is dropped, the invitation should be resent by: Text to cell phone: [phone]  Will anyone else be joining your video visit? No        Video-Visit Details    Video Start Time: 11:23 AM    Type of service:  Video Visit    Video End Time:11:52 AM    Originating Location (pt. Location): Home      Distant Location (provider location):  Off-site    Platform used for Video Visit: [facility]:  Last visit 3/2/2022bHas been doing well headache and tapered topiramate off since April-Nov because migraine wise was doing well. A few per week and was taking Nurtec and toradol was working well in tablets. Takes rizatriptan 5 mg when severe headache and works   Has been doing well and increased in symptoms in September 2022. In October -used rest of FMLA and quit her job. Worked remotely and was having general headaches, dizziness, body pain, dry mouth, joint pain and saw PCP and rheumatology for positive JOSE test  Rheumatology note reviewed Mitchell and symptoms seem with  fibromyalgia and than got a sevond opinion Dr Johnson who did not feel that patient has fibromyalgia and more testing   Patient was started on plaquenil and duloxetine -new Tx by GP. Feet are better but other -no relief    Patient restarted topiramate an up to 50 mg -restarted a week ago     Daily general headache at 3/10 in the back of the eyes -blurry vision, eye pain, fatigue and sore in the head. Not only headache but other symptoms prevents from working .       Plan:Monitor headaches and follow up in 3 months   Updated eye exam   Brain MRI for headache worsening      Past medical history significant for ADHD, anxiety, PTSD, irritable bowel syndrome, chronic migraines, sleep apnea, insomnia, and CKD 3.     Patient appears alert and no in apparent acute distress,   mentation appears normal, judgement and insight intact, normal speech.    I discussed all my recommendations with Deepika Oliva who verbalizes understanding and comfortable with the plan.  All of patient's questions were answered from the best of my knowledge.  Patient is in agreement with the plan.     26 minutes spent on the date of the encounter doing video access, chart  review,  meds review, treatment plan, documentation and further activities as noted above    LORRIE Potter, CNP [facility]  Headache certified  [facility]    Addendum  Patient's headaches persistent and more than 15 per month and lasting more than 4 hours. Headache interfere with patient's daily activity.  Decided to add Botox to headache treatment plan to maximize headache prevention. See MyChart notes.       LORRIE Potter, CNP [facility]  Headache certified  [facility]

## 2022-12-02 NOTE — LETTER
12/2/2022       RE: Deepika Bocanegrarissahattie  [address] [address]     Dear Colleague,    Thank you for referring your patient, Deepika Oliva, to the [facility] [place] [facility]. Please see a copy of my visit note below.    [facility] follow up visit note:  Last visit 3/2/2022bHas been doing well headache and tapered topiramate off since April-Nov because migraine wise was doing well. A few per week and was taking Nurtec and toradol was working well in tablets. Takes rizatriptan 5 mg when severe headache and works   Has been doing well and increased in symptoms in September 2022. In October -used rest of FMLA and quit her job. Worked remotely and was having general headaches, dizziness, body pain, dry mouth, joint pain and saw PCP and rheumatology for positive JOSE test  Rheumatology note reviewed Mitchell and symptoms seem with  fibromyalgia and than got a sevond opinion Dr Johnson who did not feel that patient has fibromyalgia and more testing   Patient was started on plaquenil and duloxetine -new Tx by GP. Feet are better but other -no relief    Patient restarted topiramate an up to 50 mg -restarted a week ago     Daily general headache at 3/10 in the back of the eyes -blurry vision, eye pain, fatigue and sore in the head. Not only headache but other symptoms prevents from working .       Plan:Monitor headaches and follow up in 3 months   Updated eye exam   Brain MRI for headache worsening      Past medical history significant for ADHD, anxiety, PTSD, irritable bowel syndrome, chronic migraines, sleep apnea, insomnia, and CKD 3.     Patient appears alert and no in apparent acute distress,  mentation appears normal, judgement and insight intact, normal speech.    I discussed all my recommendations with Deepika Oliva who verbalizes understanding and comfortable with the plan.  All of  patient's questions were answered from the best of my knowledge.  Patient is in agreement with the plan.     26 minutes spent on the date of the encounter doing video access, chart  review,  meds review, treatment plan, documentation and further activities as noted above    LORRIE Potter, CNP [facility]  Headache certified  [facility]    Addendum  Patient's headaches persistent and more than 15 per month and lasting more than 4 hours. Headache interfere with patient's daily activity.  Decided to add Botox to headache treatment plan to maximize headache prevention. See MyChart notes.

## 2022-12-03 ENCOUNTER — E-VISIT (OUTPATIENT)
Dept: URGENT CARE | Facility: CLINIC | Age: 47
End: 2022-12-03
Payer: COMMERCIAL

## 2022-12-03 DIAGNOSIS — B96.89 ACUTE BACTERIAL SINUSITIS: Primary | ICD-10-CM

## 2022-12-03 DIAGNOSIS — J01.90 ACUTE BACTERIAL SINUSITIS: Primary | ICD-10-CM

## 2022-12-03 PROCEDURE — 99421 OL DIG E/M SVC 5-10 MIN: CPT | Performed by: NURSE PRACTITIONER

## 2022-12-03 RX ORDER — PREDNISONE 20 MG/1
40 TABLET ORAL DAILY
Qty: 10 TABLET | Refills: 0 | Status: SHIPPED | OUTPATIENT
Start: 2022-12-03 | End: 2022-12-08

## 2022-12-03 NOTE — PATIENT INSTRUCTIONS
Sinusitis (Antibiotic Treatment)    The sinuses are air-filled spaces within the bones of the face. They connect to the inside of the nose. Sinusitis is an inflammation of the tissue that lines the sinuses. Sinusitis can occur during a cold. It can also happen due to allergies to pollens and other particles in the air. Sinusitis can cause symptoms of sinus congestion and a feeling of fullness. A sinus infection causes fever, headache, and facial pain. There is often green or yellow fluid draining from the nose or into the back of the throat (post-nasal drip). You have been given antibiotics to treat this condition.   Home care    Take the full course of antibiotics as instructed. Don't stop taking them, even when you feel better.    Drink plenty of water, hot tea, and other liquids as directed by the healthcare provider. This may help thin nasal mucus. It also may help your sinuses drain fluids.    Heat may help soothe painful areas of your face. Use a towel soaked in hot water. Or,  the shower and direct the warm spray onto your face. Using a vaporizer along with a menthol rub at night may also help soothe symptoms.     An expectorant with guaifenesin may help thin nasal mucus and help your sinuses drain fluids. Talk with your provider or pharmacists before taking an over-the-counter (OTC) medicine if you have any questions about it or its side effects..    You can use an OTC decongestant, unless a similar medicine was prescribed to you. Nasal sprays work the fastest. Use one that contains phenylephrine or oxymetazoline. First blow your nose gently. Then use the spray. Don't use these medicines more often than directed on the label. If you do, your symptoms may get worse. You may also take pills that contain pseudoephedrine. Don t use products that combine multiple medicines. This is because side effects may be increased. Read labels. You can also ask the pharmacist for help. (People with high blood  pressure should not use decongestants. They can raise blood pressure.) Talk with your provider or pharmacist if you have any questions about the medicine..    OTC antihistamines may help if allergies contributed to your sinusitis. Talk with your provider or pharmacist if you have any questions about the medicine..    Don't use nasal rinses or irrigation during an acute sinus infection, unless your healthcare provider tells you to. Rinsing may spread the infection to other areas in your sinuses.    Use acetaminophen or ibuprofen to control pain, unless another pain medicine was prescribed to you. If you have chronic liver or kidney disease or ever had a stomach ulcer, talk with your healthcare provider before using these medicines. Never give aspirin to anyone under age 18 who is ill with a fever. It may cause severe liver damage.    Don't smoke. This can make symptoms worse.    Follow-up care  Follow up with your healthcare provider, or as advised.   When to seek medical advice  Call your healthcare provider if any of these occur:     Facial pain or headache that gets worse    Stiff neck    Unusual drowsiness or confusion    Swelling of your forehead or eyelids    Symptoms don't go away in 10 days    Vision problems, such as blurred or double vision    Fever of 100.4 F (38 C) or higher, or as directed by your healthcare provider  Call 911  Call 911 if any of these occur:     Seizure    Trouble breathing    Feeling dizzy or faint    Fingernails, skin or lips look blue, purple , or gray  Prevention  Here are steps you can take to help prevent an infection:     Keep good hand washing habits.    Don t have close contact with people who have sore throats, colds, or other upper respiratory infections.    Don t smoke, and stay away from secondhand smoke.    Stay up to date with of your vaccines.  [employer] last reviewed this educational content on 12/1/2019 2000-2021 [employer]. All rights reserved. This  information is not intended as a substitute for professional medical care. Always follow your healthcare professional's instructions.        Dear Deepika Oliva    After reviewing your responses, I've been able to diagnose you with Acute bacterial sinusitis.      Based on your responses and diagnosis, I have prescribed   Orders Placed This Encounter     amoxicillin-clavulanate (AUGMENTIN) 875-125 MG tablet    to treat your symptoms. I have sent this to your pharmacy.?     It is also important to stay well hydrated, get lots of rest and take over-the-counter decongestants,?tylenol?or ibuprofen if you?are able to?take those medications per your primary care provider to help relieve discomfort.?     It is important that you take?all of?your prescribed medication even if your symptoms are improving after a few doses.? Taking?all of?your medicine helps prevent the symptoms from returning.?     If your symptoms worsen, you develop severe headache, vomiting, high fever (>102), or are not improving in 7 days, please contact your primary care provider for an appointment or visit any of our convenient Walk-in Care or Urgent Care Centers to be seen which can be found on our website?here.?     Thanks again for choosing?us?as your health care partner,?   ?  LORRIE Camejo CNP?

## 2022-12-06 ENCOUNTER — TELEPHONE (OUTPATIENT)
Dept: NEUROLOGY | Facility: CLINIC | Age: 47
End: 2022-12-06

## 2022-12-06 NOTE — TELEPHONE ENCOUNTER
1st attempt and scheduled with patient. Needs 3mo video follow-up with neuro/richard garcia 03/05/2023. Also needs MRI prior- transferred to [facility] [phone]

## 2022-12-07 DIAGNOSIS — G43.709 CHRONIC MIGRAINE WITHOUT AURA WITHOUT STATUS MIGRAINOSUS, NOT INTRACTABLE: Primary | ICD-10-CM
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
## 2022-12-19 ENCOUNTER — VIRTUAL VISIT (OUTPATIENT)
Dept: SURGERY | Facility: CLINIC | Age: 47
End: 2022-12-19
Payer: COMMERCIAL

## 2022-12-19 VITALS — BODY MASS INDEX: 35.61 KG/M2 | WEIGHT: 235 LBS | HEIGHT: 68 IN

## 2022-12-19 DIAGNOSIS — E66.01 MORBID OBESITY (H): Primary | ICD-10-CM

## 2022-12-19 DIAGNOSIS — K21.9 GASTROESOPHAGEAL REFLUX DISEASE WITHOUT ESOPHAGITIS: ICD-10-CM

## 2022-12-19 DIAGNOSIS — Z86.69 HISTORY OF MIGRAINE HEADACHES: ICD-10-CM

## 2022-12-19 PROCEDURE — 99214 OFFICE O/P EST MOD 30 MIN: CPT | Mod: GT | Performed by: FAMILY MEDICINE

## 2022-12-19 RX ORDER — PIMECROLIMUS 10 MG/G
CREAM TOPICAL
COMMUNITY
Start: 2022-10-21 | End: 2023-10-19

## 2022-12-19 RX ORDER — MULTIVITAMIN
1 TABLET ORAL DAILY
COMMUNITY
End: 2023-10-19

## 2022-12-19 RX ORDER — SULFACETAMIDE SODIUM, SULFUR 100; 50 MG/G; MG/G
EMULSION TOPICAL 2 TIMES DAILY
COMMUNITY
Start: 2022-12-15 | End: 2024-06-21

## 2022-12-19 RX ORDER — LIFITEGRAST 50 MG/ML
SOLUTION/ DROPS OPHTHALMIC
COMMUNITY
Start: 2022-11-22 | End: 2024-06-21
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
## 2022-12-19 NOTE — PATIENT INSTRUCTIONS

## 2023-01-17 ENCOUNTER — TELEPHONE (OUTPATIENT)
Dept: NEUROLOGY | Facility: CLINIC | Age: 48
End: 2023-01-17

## 2023-01-17 NOTE — TELEPHONE ENCOUNTER
Called to see if pt is interested in sooner appointment for Botox with Jo Adams at our [place] location. Left message for patient to call back if they would like to switch.    Lauren Whitfield MA

## 2023-01-18 ENCOUNTER — TELEPHONE (OUTPATIENT)
Dept: NEUROLOGY | Facility: CLINIC | Age: 48
End: 2023-01-18

## 2023-01-18 DIAGNOSIS — G43.709 CHRONIC MIGRAINE WITHOUT AURA WITHOUT STATUS MIGRAINOSUS, NOT INTRACTABLE: Primary | ICD-10-CM

## 2023-01-18 NOTE — TELEPHONE ENCOUNTER
Rescheduled pt 2 weeks out, waiting on Botox approval so the appointment will be covered through pt insurance. Pt will now come in on 2/2/2023.    Lauren Whitfield MA

## 2023-02-02 ENCOUNTER — OFFICE VISIT (OUTPATIENT)
Dept: NEUROLOGY | Facility: CLINIC | Age: 48
End: 2023-02-02
Payer: COMMERCIAL

## 2023-02-02 DIAGNOSIS — G43.709 CHRONIC MIGRAINE WITHOUT AURA WITHOUT STATUS MIGRAINOSUS, NOT INTRACTABLE: Primary | ICD-10-CM

## 2023-02-02 PROCEDURE — 64615 CHEMODENERV MUSC MIGRAINE: CPT

## 2023-02-02 NOTE — LETTER
2/2/2023         RE: Deepika Oliva  [address] [address]        Dear Colleague,    Thank you for referring your patient, Deepika Oliva, to the [facility] NEUROLOGY CLINICS [facility]. Please see a copy of my visit note below.    [facility]  Botulinum Toxin Procedure    Jo Adams PA-C  Headache Neurology    February 2, 2023    Procedure: OnabotulinumtoxinA injections for chronic migraine  Indication: Chronic migraine    Deepika Oliva suffers from severe intractable headaches. She was referred by Corinna Farfan NP for onabotulinumtoxinA injections for headache.      This is her first round of injections.     Deepika currently reports 20 headache days per month, with 8 severe headache days per month. Her headaches are quite disabling and often interfere with her ability to function normally.     She has attempted other migraine prophylactic treatments in the past, which have included: propranolol, amitriptyline.     She currently takes topiramate for headache prevention.  She uses sertraline, trazodone for other reasons.    Patient's pain was assessed prior to the procedure. She rated her pain today as 5 out of 10.      The procedure was explained to the patient. Benefits of the treatment were discussed including headache and migraine reduction. Risks of the procedure were reviewed including but not limited to pain, bruising, bleeding, infection, and weakness of muscles injected or those distal to injection sites. Alternatives were discussed. The patient voiced understanding of the risks and benefits. All questions answered and patient consented to proceed.    Procedural Pause: Procedural pause was conducted to verify correct patient identity, procedure to be performed, correct side and site, correct patient position, and special requirements. Appropriate hand hygiene was utilized, and each injection site was prepped with alcohol wipes or  Chloraprep swab.     Procedure Details:   200 units of onabotulinumtoxinA was diluted in 4 mL 0.9% normal saline.   A total of 150 units of onabotulinumtoxinA were injected using 30 gauge 0.5 inch needles into the muscles listed below. 50 units of onabotulinumtoxinA were wasted.     Injection Sites: Total = 150 units onabotulinumtoxinA     Procerus muscles - 5 units into the procerus muscle (5 units total)     muscles - 5 units into the left  muscle and 5 units into the right  muscle (1 injection site per muscle) (10 units total)    Frontalis muscles - 5 units into the left superior frontalis muscle and 5 units into the right superior frontalis muscle (2 injection sites per muscle) (10 units total)    Temporalis muscles - 12.5 units into the left temporalis muscle and 12.5 units into the right temporalis muscle (2 injection sites per muscle) (25 units total)    Occipitalis muscles - 12.5 units into the left occipitalis muscle and 12.5 units into the right occipitalis muscle (2 injection sites per muscle) (25 units total)    Splenius Capitis muscles - 12.5 units into the left splenius capitis muscle and 12.5 units into the right splenius capitis muscle (2 injection sites per muscle, divided into 2/3 anteriorly and 1/3 posteriorly) (25 units total)      Trapezius muscles - 25 units into the left trapezius muscle and 25 units into the right trapezius muscle (3 injection sites per muscle, divided into 5 units, 10 units, 10 units, medial to lateral) (50 units total)      Patient tolerated the procedure well without immediate complications. She will follow up in clinic for assessment of the effectiveness of treatment. She did not report any change in her pain level after the botulinumtoxinA injection procedure.      Jo Adams PA-C  Headache Neurology  [facility]        Again, thank you for allowing me to participate in the care of your patient.         Sincerely,        WANDA MCCABE PA-C

## 2023-02-02 NOTE — PROGRESS NOTES
[facility]  Botulinum Toxin Procedure    Jo Adams PA-C  Headache Neurology    February 2, 2023    Procedure: OnabotulinumtoxinA injections for chronic migraine  Indication: Chronic migraine    Deepika Oliva suffers from severe intractable headaches. She was referred by Corinna Farfan NP for onabotulinumtoxinA injections for headache.      This is her first round of injections.     Deepika currently reports 20 headache days per month, with 8 severe headache days per month. Her headaches are quite disabling and often interfere with her ability to function normally.     She has attempted other migraine prophylactic treatments in the past, which have included: propranolol, amitriptyline.     She currently takes topiramate for headache prevention.  She uses sertraline, trazodone for other reasons.    Patient's pain was assessed prior to the procedure. She rated her pain today as 5 out of 10.      The procedure was explained to the patient. Benefits of the treatment were discussed including headache and migraine reduction. Risks of the procedure were reviewed including but not limited to pain, bruising, bleeding, infection, and weakness of muscles injected or those distal to injection sites. Alternatives were discussed. The patient voiced understanding of the risks and benefits. All questions answered and patient consented to proceed.    Procedural Pause: Procedural pause was conducted to verify correct patient identity, procedure to be performed, correct side and site, correct patient position, and special requirements. Appropriate hand hygiene was utilized, and each injection site was prepped with alcohol wipes or Chloraprep swab.     Procedure Details:   200 units of onabotulinumtoxinA was diluted in 4 mL 0.9% normal saline.   A total of 150 units of onabotulinumtoxinA were injected using 30 gauge 0.5 inch needles into the muscles listed below. 50 units of onabotulinumtoxinA were wasted.      Injection Sites: Total = 150 units onabotulinumtoxinA     Procerus muscles - 5 units into the procerus muscle (5 units total)     muscles - 5 units into the left  muscle and 5 units into the right  muscle (1 injection site per muscle) (10 units total)    Frontalis muscles - 5 units into the left superior frontalis muscle and 5 units into the right superior frontalis muscle (2 injection sites per muscle) (10 units total)    Temporalis muscles - 12.5 units into the left temporalis muscle and 12.5 units into the right temporalis muscle (2 injection sites per muscle) (25 units total)    Occipitalis muscles - 12.5 units into the left occipitalis muscle and 12.5 units into the right occipitalis muscle (2 injection sites per muscle) (25 units total)    Splenius Capitis muscles - 12.5 units into the left splenius capitis muscle and 12.5 units into the right splenius capitis muscle (2 injection sites per muscle, divided into 2/3 anteriorly and 1/3 posteriorly) (25 units total)      Trapezius muscles - 25 units into the left trapezius muscle and 25 units into the right trapezius muscle (3 injection sites per muscle, divided into 5 units, 10 units, 10 units, medial to lateral) (50 units total)      Patient tolerated the procedure well without immediate complications. She will follow up in clinic for assessment of the effectiveness of treatment. She did not report any change in her pain level after the botulinumtoxinA injection procedure.      Jo Adams PA-C  Headache Neurology  [facility]

## 2023-02-07 DIAGNOSIS — G43.109 MIGRAINE WITH AURA AND WITHOUT STATUS MIGRAINOSUS, NOT INTRACTABLE: ICD-10-CM

## 2023-02-07 NOTE — TELEPHONE ENCOUNTER
Rx Authorization:    Requested Medication/ Doseketorolac (TORADOL) 10 MG tablet    Date last refill ordered: 10/7/22    Quantity ordered: 20    # refills: 0    Date of last clinic visit with ordering provider: 12/2/22    Date of next clinic visit with ordering provider: 4/27/22    All pertinent protocol data (lab date/result):     Include pertinent information from patients message:

## 2023-02-08 DIAGNOSIS — G43.109 MIGRAINE WITH AURA AND WITHOUT STATUS MIGRAINOSUS, NOT INTRACTABLE: ICD-10-CM

## 2023-02-08 NOTE — TELEPHONE ENCOUNTER
Rx Authorization:    Requested Medication/ Dose Rimegepant Sulfate 75 MG TBDP    Date last refill ordered: 12/02/21    Quantity ordered: 8    # refills: 9    Date of last clinic visit with ordering provider: 12/2/22    Date of next clinic visit with ordering provider: 4/27/23    All pertinent protocol data (lab date/result):     Include pertinent information from patients message:

## 2023-02-09 ENCOUNTER — TELEPHONE (OUTPATIENT)
Dept: NEUROLOGY | Facility: CLINIC | Age: 48
End: 2023-02-09
Payer: COMMERCIAL

## 2023-02-09 RX ORDER — KETOROLAC TROMETHAMINE 10 MG/1
10 TABLET, FILM COATED ORAL EVERY 6 HOURS PRN
Qty: 20 TABLET | Refills: 1 | Status: SHIPPED | OUTPATIENT
Start: 2023-02-09 | End: 2023-10-19

## 2023-02-09 NOTE — TELEPHONE ENCOUNTER
Prior Authorization Retail Medication Request    Medication/Dose: rimegepant (NURTEC) 75 MG ODT tablet  ICD code (if different than what is on RX):  G43.109  Previously Tried and Failed:  See Chart  Rationale:  See Chart    Insurance Name:  Scripted [facility]   Insurance ID:  [id number]       Pharmacy Information (if different than what is on RX)  Name:    Phone:

## 2023-02-14 NOTE — TELEPHONE ENCOUNTER
Prior Authorization Not Needed per Insurance    Medication: rimegepant (NURTEC) 75 MG ODT tablet--NO PA NEEDED  Insurance Company: [employer] - Phone [phone] Fax [phone]  Expected CoPay:      Pharm[facility]illing the Rx[id number] [address] AT [facility] [address]  Pharmacy Notified: Yes  Patient Notified: Yes **Instructed pharmacy to notify patient when script is ready to /ship.**

## 2023-02-15 ENCOUNTER — E-VISIT (OUTPATIENT)
Dept: URGENT CARE | Facility: URGENT CARE | Age: 48
End: 2023-02-15
Payer: COMMERCIAL

## 2023-02-15 DIAGNOSIS — J01.90 ACUTE BACTERIAL SINUSITIS: Primary | ICD-10-CM

## 2023-02-15 DIAGNOSIS — B96.89 ACUTE BACTERIAL SINUSITIS: Primary | ICD-10-CM

## 2023-02-15 PROCEDURE — 99421 OL DIG E/M SVC 5-10 MIN: CPT | Performed by: PHYSICIAN ASSISTANT

## 2023-02-15 NOTE — PATIENT INSTRUCTIONS
Sinusitis (Antibiotic Treatment)    The sinuses are air-filled spaces within the bones of the face. They connect to the inside of the nose. Sinusitis is an inflammation of the tissue that lines the sinuses. Sinusitis can occur during a cold. It can also happen due to allergies to pollens and other particles in the air. Sinusitis can cause symptoms of sinus congestion and a feeling of fullness. A sinus infection causes fever, headache, and facial pain. There is often green or yellow fluid draining from the nose or into the back of the throat (post-nasal drip). You have been given antibiotics to treat this condition.   Home care    Take the full course of antibiotics as instructed. Don't stop taking them, even when you feel better.    Drink plenty of water, hot tea, and other liquids as directed by the healthcare provider. This may help thin nasal mucus. It also may help your sinuses drain fluids.    Heat may help soothe painful areas of your face. Use a towel soaked in hot water. Or,  the shower and direct the warm spray onto your face. Using a vaporizer along with a menthol rub at night may also help soothe symptoms.     An expectorant with guaifenesin may help thin nasal mucus and help your sinuses drain fluids. Talk with your provider or pharmacists before taking an over-the-counter (OTC) medicine if you have any questions about it or its side effects..    You can use an OTC decongestant, unless a similar medicine was prescribed to you. Nasal sprays work the fastest. Use one that contains phenylephrine or oxymetazoline. First blow your nose gently. Then use the spray. Don't use these medicines more often than directed on the label. If you do, your symptoms may get worse. You may also take pills that contain pseudoephedrine. Don t use products that combine multiple medicines. This is because side effects may be increased. Read labels. You can also ask the pharmacist for help. (People with high blood  pressure should not use decongestants. They can raise blood pressure.) Talk with your provider or pharmacist if you have any questions about the medicine..    OTC antihistamines may help if allergies contributed to your sinusitis. Talk with your provider or pharmacist if you have any questions about the medicine..    Don't use nasal rinses or irrigation during an acute sinus infection, unless your healthcare provider tells you to. Rinsing may spread the infection to other areas in your sinuses.    Use acetaminophen or ibuprofen to control pain, unless another pain medicine was prescribed to you. If you have chronic liver or kidney disease or ever had a stomach ulcer, talk with your healthcare provider before using these medicines. Never give aspirin to anyone under age 18 who is ill with a fever. It may cause severe liver damage.    Don't smoke. This can make symptoms worse.    Follow-up care  Follow up with your healthcare provider, or as advised.   When to seek medical advice  Call your healthcare provider if any of these occur:     Facial pain or headache that gets worse    Stiff neck    Unusual drowsiness or confusion    Swelling of your forehead or eyelids    Symptoms don't go away in 10 days    Vision problems, such as blurred or double vision    Fever of 100.4 F (38 C) or higher, or as directed by your healthcare provider  Call 911  Call 911 if any of these occur:     Seizure    Trouble breathing    Feeling dizzy or faint    Fingernails, skin or lips look blue, purple , or gray  Prevention  Here are steps you can take to help prevent an infection:     Keep good hand washing habits.    Don t have close contact with people who have sore throats, colds, or other upper respiratory infections.    Don t smoke, and stay away from secondhand smoke.    Stay up to date with of your vaccines.  [employer] last reviewed this educational content on 12/1/2019 2000-2021 [employer]. All rights reserved. This  information is not intended as a substitute for professional medical care. Always follow your healthcare professional's instructions.        Dear Deepika Oliva    After reviewing your responses, I've been able to diagnose you with sinusitis     Given how long her symptoms been present is likely bacterial.  I have sent in a medication called Augmentin to treat this    Improve your sinus hygiene by:  Flonase/fluticasone nasal spray 2 sprays in each nostril once a day for 1 - 4 weeks.  This may take several days to become effective.   Consider saline nasal rinses  Ibuprofen 400 mg to 600 mg 4 times a day as needed for pain relief and anti-inflammatory  Psuedofed can help if you tolerate it but do not take if you have high blood pressure    If your symptoms worsen, you develop severe headache, vomiting, high fever (>102), or are not improving in 7 days, please contact your primary care provider for an appointment or visit any of our convenient Walk-in Care or Urgent Care Centers to be seen which can be found on our website?here.?     Thanks again for choosing?us?as your health care partner,?   ?  Nicko Landeros PA-C?

## 2023-04-19 DIAGNOSIS — G43.109 MIGRAINE WITH AURA AND WITHOUT STATUS MIGRAINOSUS, NOT INTRACTABLE: Primary | ICD-10-CM

## 2023-04-19 RX ORDER — RIZATRIPTAN BENZOATE 5 MG/1
5-10 TABLET, ORALLY DISINTEGRATING ORAL
Qty: 18 TABLET | Refills: 11 | Status: SHIPPED | OUTPATIENT
Start: 2023-04-19 | End: 2024-05-03

## 2023-04-20 ENCOUNTER — HOSPITAL ENCOUNTER (OUTPATIENT)
Dept: MRI IMAGING | Facility: CLINIC | Age: 48
Discharge: HOME | End: 2023-04-20
Attending: NURSE PRACTITIONER | Admitting: NURSE PRACTITIONER
Payer: COMMERCIAL

## 2023-04-20 DIAGNOSIS — R51.9 WORSENING HEADACHES: ICD-10-CM

## 2023-04-20 PROCEDURE — 70551 MRI BRAIN STEM W/O DYE: CPT

## 2023-04-27 ENCOUNTER — VIRTUAL VISIT (OUTPATIENT)
Dept: NEUROLOGY | Facility: CLINIC | Age: 48
End: 2023-04-27
Payer: COMMERCIAL

## 2023-04-27 ENCOUNTER — OFFICE VISIT (OUTPATIENT)
Dept: NEUROLOGY | Facility: CLINIC | Age: 48
End: 2023-04-27
Payer: COMMERCIAL

## 2023-04-27 DIAGNOSIS — G43.709 CHRONIC MIGRAINE WITHOUT AURA WITHOUT STATUS MIGRAINOSUS, NOT INTRACTABLE: Primary | ICD-10-CM

## 2023-04-27 PROCEDURE — 64615 CHEMODENERV MUSC MIGRAINE: CPT

## 2023-04-27 PROCEDURE — 99212 OFFICE O/P EST SF 10 MIN: CPT | Mod: VID | Performed by: NURSE PRACTITIONER

## 2023-04-27 RX ORDER — POLYETHYLENE GLYCOL 3350 17 G/17G
POWDER, FOR SOLUTION ORAL
COMMUNITY
Start: 2023-03-26 | End: 2024-06-21

## 2023-04-27 RX ORDER — LINACLOTIDE 290 UG/1
CAPSULE, GELATIN COATED ORAL
COMMUNITY
Start: 2023-03-13 | End: 2024-06-21

## 2023-04-27 RX ORDER — BETHANECHOL CHLORIDE 10 MG/1
TABLET ORAL
COMMUNITY
Start: 2023-04-07 | End: 2024-06-21

## 2023-04-27 ASSESSMENT — MIGRAINE DISABILITY ASSESSMENT (MIDAS)
HOW MANY DAYS DID YOU NOT DO HOUSEWORK BECAUSE OF HEADACHES: 3
HOW MANY DAYS IN THE PAST 3 MONTHS HAVE YOU HAD A HEADACHE: 3
HOW MANY DAYS WAS YOUR PRODUCTIVITY CUT IN HALF BECAUSE OF HEADACHES: 3
HOW MANY DAYS DID YOU MISS WORK OR SCHOOL BECAUSE OF HEADACHES: 3
HOW OFTEN WERE SOCIAL ACTIVITIES MISSED DUE TO HEADACHES: 3
TOTAL SCORE: 45
HOW MANY DAYS WAS HOUSEWORK PRODUCTIVITY CUT IN HALF DUE TO HEADACHES: 33
ON A SCALE FROM 0-10 ON AVERAGE HOW PAINFUL WERE HEADACHES: 5

## 2023-04-27 ASSESSMENT — HEADACHE IMPACT TEST (HIT 6)
WHEN YOU HAVE A HEADACHE HOW OFTEN DO YOU WISH YOU COULD LIE DOWN: SOMETIMES
HOW OFTEN HAVE YOU FELT FED UP OR IRRITATED BECAUSE OF YOUR HEADACHES: SOMETIMES
HIT6 TOTAL SCORE: 60
HOW OFTEN DID HEADACHS LIMIT CONCENTRATION ON WORK OR DAILY ACTIVITY: SOMETIMES
WHEN YOU HAVE HEADACHES HOW OFTEN IS THE PAIN SEVERE: SOMETIMES
HOW OFTEN HAVE YOU FELT TOO TIRED TO WORK BECAUSE OF YOUR HEADACHES: SOMETIMES
HOW OFTEN DO HEADACHES LIMIT YOUR DAILY ACTIVITIES: SOMETIMES

## 2023-04-27 NOTE — PROGRESS NOTES
Deepika is a 47 year old who is being evaluated via a billable video visit.        Subjective   Deepika is a 47 year old, presenting for the following health issues:  Video Visit (Follow up - Headaches- Imaging results )    Has been doing Botox and it was helpful with migraines and TMD pain. Headaches much better.  Rizatriptan only takes it once per week and little bit more when Botox wears off.   Topiramate 100 mg at bedtime and has been effective. No side effects.     [facility] for years. Has been followed for TMJ. Has a splint at night.     Past medical history significant for ADHD, anxiety, PTSD, irritable bowel syndrome, chronic migraines, sleep apnea, insomnia, and CKD 3.       Plan:  Continue with Botox injections and topiramate for migraine prevention  Rescue treatment -rizatriptan as needed   Follow up in a year or sooner if needed       DATA reviewed with patient   EXAM: MR BRAIN W/O CONTRAST  LOCATION: [facility]  DATE/TIME: 4/20/2023 12:29 PM CDT     INDICATION: Headache; Existing HA disorder with clinical progression; No known automatically detected potential contraindications to MRI  COMPARISON: None.  TECHNIQUE: Routine multiplanar multisequence head MRI without intravenous contrast.     FINDINGS:  INTRACRANIAL CONTENTS: No evidence for acute or subacute infarction based on diffusion-weighted imaging. No mass, acute hemorrhage, or extra-axial fluid collections. Few scattered foci of nonspecific T2/FLAIR hyperintensity within the white matter likely   reflect minor changes of chronic small vessel ischemic injury, though are within normal limits for patient age. Normal ventricles and sulci. Normal position of the cerebellar tonsils.      SELLA: No abnormality accounting for technique.     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 Mild marrow edema in the left mandibular  "condyle and likely small left facet joint effusion. No pathologic   bone marrow signal abnormality. The major intracranial vascular flow voids are maintained.      ORBITS: No abnormality accounting for technique.      SINUSES/MASTOIDS: Mild mucosal thickening in the anterior ethmoid air cells bilaterally. No middle ear or mastoid effusion.                                                                          IMPRESSION:  1.  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 Recommend clinical correlation for TMJ dysfunction and consideration for orthodontic consultation.  2.  No acute/subacute infarction, intracranial hemorrhage, mass effect, or hydrocephalus.  3.  Presumed sequelae of mild chronic small vessel seen disease, though within normal limits for patient age.      Objective    Vitals - Patient Reported  Weight (Patient Reported): 101.2 kg (223 lb)  Height (Patient Reported): 170.2 cm (5' 7\")  BMI (Based on Pt Reported Ht/Wt): 34.93  Pain Score: No Pain (0)      Physical Exam   Patient is alert and no in apparent acute distress,  mentation appears normal, judgement and insight intact, normal speech.    I discussed all my recommendations with Deepika Oliva who verbalizes understanding and comfortable with the plan.  All of patient's questions were answered from the best of my knowledge.    16 minutes spent on the date of the encounter doing video access, chart  review, results review  meds review, treatment plan, documentation and further activities as noted above    LORRIE Potter, CNP [facility]  Headache certified  [facility]      Video-Visit Details    Type of service:  Video Visit     Originating Location (pt. Location): Home  Distant Location (provider location):  Off-site  Platform used for Video Visit: Josie"

## 2023-04-27 NOTE — LETTER
4/27/2023       RE: Deepika Oliva  [address] [address]       Dear Colleague,    Thank you for referring your patient, Deepika Oliva, to the [facility]OLOG[facility] [place] at [facility]. Please see a copy of my visit note below.    Deepika is a 47 year old who is being evaluated via a billable video visit.        Subjective   Deepika is a 47 year old, presenting for the following health issues:  Video Visit (Follow up - Headaches- Imaging results )    Has been doing Botox and it was helpful with migraines and TMD pain. Headaches much better.  Rizatriptan only takes it once per week and little bit more when Botox wears off.   Topiramate 100 mg at bedtime and has been effective. No side effects.     [facility] for years. Has been followed for TMJ. Has a splint at night.     Past medical history significant for ADHD, anxiety, PTSD, irritable bowel syndrome, chronic migraines, sleep apnea, insomnia, and CKD 3.       Plan:  Continue with Botox injections and topiramate for migraine prevention  Rescue treatment -rizatriptan as needed   Follow up in a year or sooner if needed       DATA reviewed with patient   EXAM: MR BRAIN W/O CONTRAST  LOCATION: [facility]  DATE/TIME: 4/20/2023 12:29 PM CDT     INDICATION: Headache; Existing HA disorder with clinical progression; No known automatically detected potential contraindications to MRI  COMPARISON: None.  TECHNIQUE: Routine multiplanar multisequence head MRI without intravenous contrast.     FINDINGS:  INTRACRANIAL CONTENTS: No evidence for acute or subacute infarction based on diffusion-weighted imaging. No mass, acute hemorrhage, or extra-axial fluid collections. Few scattered foci of nonspecific T2/FLAIR hyperintensity within the white matter likely   reflect minor changes of chronic small vessel ischemic injury, though are within normal  "limits for patient age. Normal ventricles and sulci. Normal position of the cerebellar tonsils.      SELLA: No abnormality accounting for technique.     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 Mild marrow edema in the left mandibular condyle and likely small left facet joint effusion. No pathologic   bone marrow signal abnormality. The major intracranial vascular flow voids are maintained.      ORBITS: No abnormality accounting for technique.      SINUSES/MASTOIDS: Mild mucosal thickening in the anterior ethmoid air cells bilaterally. No middle ear or mastoid effusion.                                                                          IMPRESSION:  1.  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 Recommend clinical correlation for TMJ dysfunction and consideration for orthodontic consultation.  2.  No acute/subacute infarction, intracranial hemorrhage, mass effect, or hydrocephalus.  3.  Presumed sequelae of mild chronic small vessel seen disease, though within normal limits for patient age.      Objective    Vitals - Patient Reported  Weight (Patient Reported): 101.2 kg (223 lb)  Height (Patient Reported): 170.2 cm (5' 7\")  BMI (Based on Pt Reported Ht/Wt): 34.93  Pain Score: No Pain (0)      Physical Exam   Patient is alert and no in apparent acute distress,  mentation appears normal, judgement and insight intact, normal speech.    I discussed all my recommendations with Deepika Oliva who verbalizes understanding and comfortable with the plan.  All of patient's questions were answered from the best of my knowledge.    16 minutes spent on the date of the encounter doing video access, chart  review, results review  meds review, treatment plan, documentation and further activities as noted above            Again, thank you for allowing me to participate in the care of your patient.  "     Sincerely,    LORRIE Kelly CNP

## 2023-04-27 NOTE — PATIENT INSTRUCTIONS
Plan:  Continue with Botox injections and topiramate for migraine prevention  Rescue treatment -rizatriptan as needed   Follow up in a year or sooner if needed

## 2023-04-27 NOTE — PROGRESS NOTES
"[facility]  Botulinum Toxin Procedure    Jo Adams PA-C  Headache Neurology    April 27, 2023    Procedure: OnabotulinumtoxinA injections for chronic migraine  Indication: Chronic migraine    Deepika Oliva suffers from severe intractable headaches. She was referred by Corinna Farfan NP for onabotulinumtoxinA injections for headache.     Her baseline headaches are a constant, deep ache and pressure accompanied by photophobia, phonophobia, nausea, lasting 1-2 days in duration.    Prior to initiation of botulinum toxin injections, Deepika reported 20/30 headache days per month, with 8/30 severe headache days per month. Her headaches are quite disabling and often interfere with her ability to function normally.    Their last round of botulinum toxin injections was on 2/02/2023.    Deepika reports 4 headache days per month currently, with 0 severe headache days per month. She has noticed a wearing off phenomenon prior to this round of botulinum toxin injections, lasting 2 weeks.     Deepika reports the following benefits of botulinum toxin injections from their last round: \"significantly improved my neck pain and facial pain, as well as my headaches\"    She has attempted other migraine prophylactic treatments in the past, which have included: propranolol, amitriptyline.     She currently takes topiramate for headache prevention. She uses sertraline, trazodone for other reasons.    Patient's pain was assessed prior to the procedure. She rated her pain today as 5 out of 10.      The procedure was explained to the patient. Benefits of the treatment were discussed including headache and migraine reduction. Risks of the procedure were reviewed including but not limited to pain, bruising, bleeding, infection, and weakness of muscles injected or those distal to injection sites. Alternatives were discussed. The patient voiced understanding of the risks and benefits. All questions answered and patient " consented to proceed.    Procedural Pause: Procedural pause was conducted to verify correct patient identity, procedure to be performed, correct side and site, correct patient position, and special requirements. Appropriate hand hygiene was utilized, and each injection site was prepped with alcohol wipes or Chloraprep swab.     Procedure Details:   200 units of onabotulinumtoxinA was diluted in 4 mL 0.9% normal saline.   A total of 155 units of onabotulinumtoxinA were injected using 30 gauge 0.5 inch needles into the muscles listed below. 45 units of onabotulinumtoxinA were wasted.     Injection Sites: Total = 155 units onabotulinumtoxinA     Procerus muscles - 5 units into the procerus muscle (5 units total)     muscles - 5 units into the left  muscle and 5 units into the right  muscle (1 injection site per muscle) (10 units total)    Frontalis muscles - 5 units into the left superior frontalis muscle and 5 units into the right superior frontalis muscle (2 injection sites per muscle) (10 units total)    Temporalis muscles - 12.5 units into the left temporalis muscle and 12.5 units into the right temporalis muscle (2 injection sites per muscle) (25 units total)    ** Occipitalis muscles - 10 units into the left occipitalis muscle and 10 units into the right occipitalis muscle (2 injection sites per muscle) (20 units total)    Splenius Capitis muscles - 12.5 units into the left splenius capitis muscle and 12.5 units into the right splenius capitis muscle (2 injection sites per muscle, divided into 2/3 anteriorly and 1/3 posteriorly) (25 units total)      Trapezius muscles - 25 units into the left trapezius muscle and 25 units into the right trapezius muscle (3 injection sites per muscle, divided into 5 units, 10 units, 10 units, medial to lateral) (50 units total)     ** Masseter muscles - 5 units into the left masseter muscle and 5 units into the right masseter muscle (1 injection site  per muscle) (10 units total)        Patient tolerated the procedure well without immediate complications. She will follow up in clinic for assessment of the effectiveness of treatment. She did not report any change in her pain level after the botulinumtoxinA injection procedure.      Jo Adams PA-C  Headache Neurology  [facility] Neurology [facility]

## 2023-04-27 NOTE — NURSING NOTE
Is the patient currently in the state of [place]? YES    Visit mode:VIDEO    If the visit is dropped, the patient can be reconnected by: VIDEO VISIT: Text to cell phone: [phone]    Will anyone else be joining the visit? NO      How would you like to obtain your AVS? MyChart    Are changes needed to the allergy or medication list? YES: Patient is allergic to Codeine and not morphine. Pt is also taking Botox medication     Reason for visit: Video Visit (Follow up - Headaches- Imaging results )

## 2023-04-27 NOTE — LETTER
"    4/27/2023         RE: Deepika Oliva  [address] [address]        Dear Colleague,    Thank you for referring your patient, Deepika Oliva, to the [facility] NEUROLOGY CLINICS [facility]. Please see a copy of my visit note below.    [facility]  Botulinum Toxin Procedure    Jo Adams PA-C  Headache Neurology    April 27, 2023    Procedure: OnabotulinumtoxinA injections for chronic migraine  Indication: Chronic migraine    Deepika Oliva suffers from severe intractable headaches. She was referred by Corinna Farfan NP for onabotulinumtoxinA injections for headache.     Her baseline headaches are a constant, deep ache and pressure accompanied by photophobia, phonophobia, nausea, lasting 1-2 days in duration.    Prior to initiation of botulinum toxin injections, Deepika reported 20/30 headache days per month, with 8/30 severe headache days per month. Her headaches are quite disabling and often interfere with her ability to function normally.    Their last round of botulinum toxin injections was on 2/02/2023.    Deepika reports 4 headache days per month currently, with 0 severe headache days per month. She has noticed a wearing off phenomenon prior to this round of botulinum toxin injections, lasting 2 weeks.     Deepika reports the following benefits of botulinum toxin injections from their last round: \"significantly improved my neck pain and facial pain, as well as my headaches\"    She has attempted other migraine prophylactic treatments in the past, which have included: propranolol, amitriptyline.     She currently takes topiramate for headache prevention. She uses sertraline, trazodone for other reasons.    Patient's pain was assessed prior to the procedure. She rated her pain today as 5 out of 10.      The procedure was explained to the patient. Benefits of the treatment were discussed including headache and migraine reduction. Risks of the " procedure were reviewed including but not limited to pain, bruising, bleeding, infection, and weakness of muscles injected or those distal to injection sites. Alternatives were discussed. The patient voiced understanding of the risks and benefits. All questions answered and patient consented to proceed.    Procedural Pause: Procedural pause was conducted to verify correct patient identity, procedure to be performed, correct side and site, correct patient position, and special requirements. Appropriate hand hygiene was utilized, and each injection site was prepped with alcohol wipes or Chloraprep swab.     Procedure Details:   200 units of onabotulinumtoxinA was diluted in 4 mL 0.9% normal saline.   A total of 155 units of onabotulinumtoxinA were injected using 30 gauge 0.5 inch needles into the muscles listed below. 45 units of onabotulinumtoxinA were wasted.     Injection Sites: Total = 155 units onabotulinumtoxinA     Procerus muscles - 5 units into the procerus muscle (5 units total)     muscles - 5 units into the left  muscle and 5 units into the right  muscle (1 injection site per muscle) (10 units total)    Frontalis muscles - 5 units into the left superior frontalis muscle and 5 units into the right superior frontalis muscle (2 injection sites per muscle) (10 units total)    Temporalis muscles - 12.5 units into the left temporalis muscle and 12.5 units into the right temporalis muscle (2 injection sites per muscle) (25 units total)    ** Occipitalis muscles - 10 units into the left occipitalis muscle and 10 units into the right occipitalis muscle (2 injection sites per muscle) (20 units total)    Splenius Capitis muscles - 12.5 units into the left splenius capitis muscle and 12.5 units into the right splenius capitis muscle (2 injection sites per muscle, divided into 2/3 anteriorly and 1/3 posteriorly) (25 units total)      Trapezius muscles - 25 units into the left trapezius  muscle and 25 units into the right trapezius muscle (3 injection sites per muscle, divided into 5 units, 10 units, 10 units, medial to lateral) (50 units total)     ** Masseter muscles - 5 units into the left masseter muscle and 5 units into the right masseter muscle (1 injection site per muscle) (10 units total)        Patient tolerated the procedure well without immediate complications. She will follow up in clinic for assessment of the effectiveness of treatment. She did not report any change in her pain level after the botulinumtoxinA injection procedure.      Wanda Adams PA-C  Headache Neurology  [facility] Neurology [facility]        Again, thank you for allowing me to participate in the care of your patient.        Sincerely,        WANDA ADAMS PA-C

## 2023-04-28 ENCOUNTER — TELEPHONE (OUTPATIENT)
Dept: NEUROLOGY | Facility: CLINIC | Age: 48
End: 2023-04-28
Payer: COMMERCIAL

## 2023-04-28 NOTE — TELEPHONE ENCOUNTER
Left Voicemail (1st Attempt), sent MyC for the patient to call back and schedule the following:    Appointment type: Return headache  Provider: LORRIE Potter CNP  Return date: Around 4/27/2024  Specialty phone number: [phone]  Additional appointment(s) needed: N/A  Additonal Notes: N/A    Markel Kidd on 4/28/2023 at 5:34 PM

## 2023-05-17 DIAGNOSIS — G43.709 CHRONIC MIGRAINE WITHOUT AURA WITHOUT STATUS MIGRAINOSUS, NOT INTRACTABLE: ICD-10-CM

## 2023-05-17 RX ORDER — TOPIRAMATE 25 MG/1
TABLET, FILM COATED ORAL
Qty: 120 TABLET | Refills: 6 | Status: SHIPPED | OUTPATIENT
Start: 2023-05-17 | End: 2023-05-23

## 2023-05-17 NOTE — TELEPHONE ENCOUNTER
Rx Authorization:  Requested Medication/ Dose topiramate (TOPAMAX) 25 MG tablet  Date last refill ordered: 11/21/22  Quantity ordered: 120 tablets  # refills: 5  Date of last clinic visit with ordering provider: 4/27/23  Date of next clinic visit with ordering provider:   All pertinent protocol data (lab date/result):   Include pertinent information from patients message:

## 2023-05-23 DIAGNOSIS — G43.709 CHRONIC MIGRAINE WITHOUT AURA WITHOUT STATUS MIGRAINOSUS, NOT INTRACTABLE: ICD-10-CM

## 2023-05-23 RX ORDER — TOPIRAMATE 25 MG/1
TABLET, FILM COATED ORAL
Qty: 150 TABLET | Refills: 6 | Status: SHIPPED | OUTPATIENT
Start: 2023-05-23 | End: 2024-02-09

## 2023-07-19 ENCOUNTER — TELEPHONE (OUTPATIENT)
Dept: NEUROLOGY | Facility: CLINIC | Age: 48
End: 2023-07-19
Payer: COMMERCIAL

## 2023-07-20 ENCOUNTER — OFFICE VISIT (OUTPATIENT)
Dept: NEUROLOGY | Facility: CLINIC | Age: 48
End: 2023-07-20
Payer: COMMERCIAL

## 2023-07-20 DIAGNOSIS — G43.709 CHRONIC MIGRAINE WITHOUT AURA WITHOUT STATUS MIGRAINOSUS, NOT INTRACTABLE: Primary | ICD-10-CM

## 2023-07-20 PROCEDURE — 64615 CHEMODENERV MUSC MIGRAINE: CPT

## 2023-07-20 NOTE — PROGRESS NOTES
[facility]  Botulinum Toxin Procedure    Jo Adams PA-C  Headache Neurology    July 20, 2023    Procedure: OnabotulinumtoxinA injections for chronic migraine  Indication: Chronic migraine    Deepika Oliva suffers from severe intractable headaches. She was referred by Corinna Farfan NP for onabotulinumtoxinA injections for headache.      Her baseline headaches are a constant, deep ache and pressure accompanied by photophobia, phonophobia, nausea, lasting 1-2 days in duration.     Prior to initiation of botulinum toxin injections, Deepika reported 20/30 headache days per month, with 8/30 severe headache days per month. Her headaches are quite disabling and often interfere with her ability to function normally.    Their last round of botulinum toxin injections was on 4/27/2023.    Deepika reports 4/30 headache days per month currently, with 0/30 severe headache days per month. She has noticed a wearing off phenomenon prior to this round of botulinum toxin injections, lasting 2 weeks.     Deepika reports the following benefits of botulinum toxin injections from their last round: She has been able to drive trips longer than 15 minutes without starting to have pain.     She has attempted other migraine prophylactic treatments in the past, which have included: propranolol, amitriptyline.      She currently takes topiramate for headache prevention. She uses sertraline, trazodone for other reasons.    Patient's pain was assessed prior to the procedure. She rated her pain today as 0 out of 10.      The procedure was explained to the patient. Benefits of the treatment were discussed including headache and migraine reduction. Risks of the procedure were reviewed including but not limited to pain, bruising, bleeding, infection, and weakness of muscles injected or those distal to injection sites. Alternatives were discussed. The patient voiced understanding of the risks and benefits. All questions  answered and patient consented to proceed.    Procedural Pause: Procedural pause was conducted to verify correct patient identity, procedure to be performed, correct side and site, correct patient position, and special requirements. Appropriate hand hygiene was utilized, and each injection site was prepped with alcohol wipes or Chloraprep swab.     Procedure Details:   200 units of onabotulinumtoxinA was diluted in 4 mL 0.9% normal saline.   A total of 150 units of onabotulinumtoxinA were injected using 30 gauge 0.5 inch needles into the muscles listed below. 50 units of onabotulinumtoxinA were wasted.     Injection Sites: Total = 150 units onabotulinumtoxinA     Procerus muscles - 5 units into the procerus muscle (5 units total)     muscles - 5 units into the left  muscle and 5 units into the right  muscle (1 injection site per muscle) (10 units total)    Frontalis muscles - 5 units into the left superior frontalis muscle and 5 units into the right superior frontalis muscle (2 injection sites per muscle) (10 units total)    Temporalis muscles - 12.5 units into the left temporalis muscle and 12.5 units into the right temporalis muscle (2 injection sites per muscle) (25 units total)    ** Occipitalis muscles - 10 units into the left occipitalis muscle and 10 units into the right occipitalis muscle (2 injection sites per muscle) (20 units total)    ** Splenius Capitis muscles - 10 units into the left splenius capitis muscle and 10 units into the right splenius capitis muscle (2 injection sites per muscle) (20 units total)      Trapezius muscles - 25 units into the left trapezius muscle and 25 units into the right trapezius muscle (3 injection sites per muscle, divided into 5 units, 10 units, 10 units, medial to lateral) (50 units total)     ** Masseter muscles - 5 units into the left masseter muscle and 5 units into the right masseter muscle (1 injection site per muscle) (10 units  total)      Patient tolerated the procedure well without immediate complications. She will follow up in clinic for assessment of the effectiveness of treatment. She did not report any change in her pain level after the botulinumtoxinA injection procedure.      Jo Adams PA-C  Headache Neurology  [facility] Neurology [facility]

## 2023-07-20 NOTE — LETTER
7/20/2023         RE: Deepika Oliva  [address] [facility] [address]        Dear Colleague,    Thank you for referring your patient, Deepika Oliva, to the [facility] NEUROLOGY CLINICS [facility]. Please see a copy of my visit note below.    [facility]  Botulinum Toxin Procedure    Jo Adams PA-C  Headache Neurology    July 20, 2023    Procedure: OnabotulinumtoxinA injections for chronic migraine  Indication: Chronic migraine    Deepika Oliva suffers from severe intractable headaches. She was referred by Corinna Farfan NP for onabotulinumtoxinA injections for headache.      Her baseline headaches are a constant, deep ache and pressure accompanied by photophobia, phonophobia, nausea, lasting 1-2 days in duration.     Prior to initiation of botulinum toxin injections, Deepika reported 20/30 headache days per month, with 8/30 severe headache days per month. Her headaches are quite disabling and often interfere with her ability to function normally.    Their last round of botulinum toxin injections was on 4/27/2023.    Deepika reports 4/30 headache days per month currently, with 0/30 severe headache days per month. She has noticed a wearing off phenomenon prior to this round of botulinum toxin injections, lasting 2 weeks.     Deepika reports the following benefits of botulinum toxin injections from their last round: She has been able to drive trips longer than 15 minutes without starting to have pain.     She has attempted other migraine prophylactic treatments in the past, which have included: propranolol, amitriptyline.      She currently takes topiramate for headache prevention. She uses sertraline, trazodone for other reasons.    Patient's pain was assessed prior to the procedure. She rated her pain today as 0 out of 10.      The procedure was explained to the patient. Benefits of the treatment were discussed including headache and migraine  reduction. Risks of the procedure were reviewed including but not limited to pain, bruising, bleeding, infection, and weakness of muscles injected or those distal to injection sites. Alternatives were discussed. The patient voiced understanding of the risks and benefits. All questions answered and patient consented to proceed.    Procedural Pause: Procedural pause was conducted to verify correct patient identity, procedure to be performed, correct side and site, correct patient position, and special requirements. Appropriate hand hygiene was utilized, and each injection site was prepped with alcohol wipes or Chloraprep swab.     Procedure Details:   200 units of onabotulinumtoxinA was diluted in 4 mL 0.9% normal saline.   A total of 150 units of onabotulinumtoxinA were injected using 30 gauge 0.5 inch needles into the muscles listed below. 50 units of onabotulinumtoxinA were wasted.     Injection Sites: Total = 150 units onabotulinumtoxinA     Procerus muscles - 5 units into the procerus muscle (5 units total)     muscles - 5 units into the left  muscle and 5 units into the right  muscle (1 injection site per muscle) (10 units total)    Frontalis muscles - 5 units into the left superior frontalis muscle and 5 units into the right superior frontalis muscle (2 injection sites per muscle) (10 units total)    Temporalis muscles - 12.5 units into the left temporalis muscle and 12.5 units into the right temporalis muscle (2 injection sites per muscle) (25 units total)    ** Occipitalis muscles - 10 units into the left occipitalis muscle and 10 units into the right occipitalis muscle (2 injection sites per muscle) (20 units total)    ** Splenius Capitis muscles - 10 units into the left splenius capitis muscle and 10 units into the right splenius capitis muscle (2 injection sites per muscle) (20 units total)      Trapezius muscles - 25 units into the left trapezius muscle and 25 units into the  right trapezius muscle (3 injection sites per muscle, divided into 5 units, 10 units, 10 units, medial to lateral) (50 units total)     ** Masseter muscles - 5 units into the left masseter muscle and 5 units into the right masseter muscle (1 injection site per muscle) (10 units total)      Patient tolerated the procedure well without immediate complications. She will follow up in clinic for assessment of the effectiveness of treatment. She did not report any change in her pain level after the botulinumtoxinA injection procedure.      Wanda Adams PA-C  Headache Neurology  [facility]        Again, thank you for allowing me to participate in the care of your patient.        Sincerely,        WANDA ADAMS PA-C

## 2023-10-19 ENCOUNTER — OFFICE VISIT (OUTPATIENT)
Dept: NEUROLOGY | Facility: CLINIC | Age: 48
End: 2023-10-19
Payer: COMMERCIAL

## 2023-10-19 DIAGNOSIS — G43.709 CHRONIC MIGRAINE WITHOUT AURA WITHOUT STATUS MIGRAINOSUS, NOT INTRACTABLE: Primary | ICD-10-CM

## 2023-10-19 PROCEDURE — 64615 CHEMODENERV MUSC MIGRAINE: CPT

## 2023-10-19 NOTE — PROGRESS NOTES
[facility]  Botulinum Toxin Procedure    Jo Adams PA-C  Headache Neurology    October 19, 2023    Procedure: OnabotulinumtoxinA injections for chronic migraine  Indication: Chronic migraine    Deepika Oliva suffers from severe intractable headaches. She was referred by Corinna Farfan NP for onabotulinumtoxinA injections for headache.      Her baseline headaches are a constant, deep ache and pressure accompanied by photophobia, phonophobia, nausea, lasting 1-2 days in duration.     Prior to initiation of botulinum toxin injections, Deepika reported 20/30 headache days per month, with 8/30 severe headache days per month. Her headaches are quite disabling and often interfere with her ability to function normally.    Their last round of botulinum toxin injections was on 7/20/2023.    Deepika reports 8 headache days per month currently, with 0-8 severe headache days per month. She has noticed a wearing off phenomenon prior to this round of botulinum toxin injections, lasting 3 weeks.     Deepika reports the following benefits of botulinum toxin injections from their last round: She was able to go on a cruise, but unfortunately had some motion sickness. She only had mild headache on the cruise.     She currently takes topiramate for headache prevention.    Patient's pain was assessed prior to the procedure. She rated her pain today as 0 out of 10.      The procedure was explained to the patient. Benefits of the treatment were discussed including headache and migraine reduction. Risks of the procedure were reviewed including but not limited to pain, bruising, bleeding, infection, and weakness of muscles injected or those distal to injection sites. Alternatives were discussed. The patient voiced understanding of the risks and benefits. All questions answered and patient consented to proceed.    Procedural Pause: Procedural pause was conducted to verify correct patient identity, procedure to be  performed, correct side and site, correct patient position, and special requirements. Appropriate hand hygiene was utilized, and each injection site was prepped with alcohol wipes or Chloraprep swab.     Procedure Details:   200 units of onabotulinumtoxinA was diluted in 4 mL 0.9% normal saline.   A total of 150 units of onabotulinumtoxinA were injected using 30 gauge 0.5 inch needles into the muscles listed below. 50 units of onabotulinumtoxinA were wasted.     Injection Sites: Total = 150 units onabotulinumtoxinA     Procerus muscles - 5 units into the procerus muscle (5 units total)      muscles - 5 units into the left  muscle and 5 units into the right  muscle (1 injection site per muscle) (10 units total)     Frontalis muscles - 5 units into the left superior frontalis muscle and 5 units into the right superior frontalis muscle (2 injection sites per muscle) (10 units total)     Temporalis muscles - 12.5 units into the left temporalis muscle and 12.5 units into the right temporalis muscle (2 injection sites per muscle) (25 units total)     ** Occipitalis muscles - 10 units into the left occipitalis muscle and 10 units into the right occipitalis muscle (2 injection sites per muscle) (20 units total)     ** Splenius Capitis muscles - 10 units into the left splenius capitis muscle and 10 units into the right splenius capitis muscle (2 injection sites per muscle) (20 units total)                 Trapezius muscles - 25 units into the left trapezius muscle and 25 units into the right trapezius muscle (3 injection sites per muscle, divided into 5 units, 10 units, 10 units, medial to lateral) (50 units total)                 ** Masseter muscles - 5 units into the left masseter muscle and 5 units into the right masseter muscle (1 injection site per muscle) (10 units total)      Patient tolerated the procedure well without immediate complications. She will follow up in clinic for assessment  of the effectiveness of treatment. She did not report any change in her pain level after the botulinumtoxinA injection procedure.      Jo Adams PA-C  Headache Neurology  [facility]rology [facility]

## 2023-10-23 DIAGNOSIS — G43.709 CHRONIC MIGRAINE WITHOUT AURA WITHOUT STATUS MIGRAINOSUS, NOT INTRACTABLE: Primary | ICD-10-CM

## 2024-01-03 ENCOUNTER — TELEPHONE (OUTPATIENT)
Dept: NEUROLOGY | Facility: CLINIC | Age: 49
End: 2024-01-03
Payer: COMMERCIAL

## 2024-01-03 NOTE — TELEPHONE ENCOUNTER
"Received Documentation (x2) from [facility].    Reason:   12/19/23 - 12/17/24  OnabotulinumtoxinA 200 Unit Injection - Botox.  At this time they \"approved this request\"    Routing to provider, CAM and HIM.    Jennifer Ireland MA  [facility]    "

## 2024-01-10 ENCOUNTER — TELEPHONE (OUTPATIENT)
Dept: NEUROLOGY | Facility: CLINIC | Age: 49
End: 2024-01-10
Payer: COMMERCIAL

## 2024-01-11 ENCOUNTER — OFFICE VISIT (OUTPATIENT)
Dept: NEUROLOGY | Facility: CLINIC | Age: 49
End: 2024-01-11
Payer: COMMERCIAL

## 2024-01-11 DIAGNOSIS — G43.709 CHRONIC MIGRAINE WITHOUT AURA WITHOUT STATUS MIGRAINOSUS, NOT INTRACTABLE: Primary | ICD-10-CM

## 2024-01-11 PROCEDURE — 64615 CHEMODENERV MUSC MIGRAINE: CPT | Performed by: PSYCHIATRY & NEUROLOGY

## 2024-01-11 NOTE — PROGRESS NOTES
[facility]  Botulinum Toxin Procedure    Phoebe Main MD  Headache Neurology    January 11, 2024    Procedure:  OnabotulinumtoxinA injections for chronic migraine  Indication:  Chronic migraine    Ms. Oliva suffers from severe intractable headaches.  She was referred by Corinna Farfan NP for onabotulinumtoxinA injections for headache.  Risks, benefits, and alternatives were discussed.  All questions were answered and consent given.  She decided to proceed with the injections.     Her baseline headaches are a constant, deep ache and pressure accompanied by photophobia, phonophobia, nausea, lasting 1-2 days in duration.     Prior to initiation of botulinum toxin injections, Deepika reported 20/30 headache days per month, with 8/30 severe headache days per month. Her headaches are quite disabling and often interfere with her ability to function normally.       Date of last injections: 10/19/2023    Ms. Oliva reports 5 headache days per month currently, with 2 severe headache days per month.  She has noticed a wearing off phenomenon prior to this round of botulinum toxin injections, lasting 2 days.    Deepika reports the following benefits of botulinum toxin injections from their last round: She was able to enjoy the holidays and go out with family and stay up late with them.     She currently takes topiramate for headache prevention.     Patient's pain was assessed prior to the procedure. She rated her pain today as 4 out of 10.    Procedural Pause: Procedural pause was conducted to verify correct patient identity, procedure to be performed, correct side and site, correct patient position, and special requirements. Appropriate hand hygiene was utilized, and each injection site was prepped with alcohol wipe or Chloraprep swab.     Procedure Details: 200 units of onabotulinumtoxinA was diluted in 4 mL 0.9% normal saline. A total of 150 units of onabotulinumtoxinA were injected using 30 gauge 0.5  in needles into the muscles listed below. 50 units of onabotulinumtoxinA were wasted.     Injection Sites: Total = 150 units onabotulinumtoxinA      and Procerus muscles - 5 units into the left and right corrugators and 5 units into the procerus (15 units total)    Frontalis muscles - 5 units into the left superior frontalis and 5 units into the right superior frontalis (2 injection sites per muscle) (10 units total)    Temporalis muscles - 12.5 units into the left temporalis muscle and 12.5 units into the right temporalis muscle (2 injection sites per muscle) (25 units total)    Occipitalis muscles - 12.5 units into the left occipitalis muscle and 12.5 units into the right occipitalis muscle (2 injection sites per muscle) (25 units total)    Splenius Capitis muscles - 12.5 units into the left splenius capitis muscle and 12.5 units into the right splenius capitis muscle (2 injection sites per muscle, divided into 2/3 anteriorly and 1/3 posteriorly) (25 units total)      Trapezius muscles - 25 units into the left trapezius muscle and 25 units into the right trapezius muscle (3 injection sites per muscle, divided 5 units, 10 units, 10 units, medial to lateral) (50 units total)    Ms. Oliva tolerated the procedure well without immediate complications.  She will follow up in clinic for assessment of the effectiveness of treatment.  She did not report any change in her pain level after the botulinumtoxinA injection procedure.    Phoebe Main MD  Headache Neurology  [facility]

## 2024-01-11 NOTE — LETTER
1/11/2024         RE: Deepika Oliva  [address] [facility] [address]        Dear Colleague,    Thank you for referring your patient, Deepika Oliva, to the [facility] NEUROLOGY CLINICS [facility]. Please see a copy of my visit note below.    [facility]  Botulinum Toxin Procedure    Phoebe Main MD  Headache Neurology    January 11, 2024    Procedure:  OnabotulinumtoxinA injections for chronic migraine  Indication:  Chronic migraine    Ms. Oliva suffers from severe intractable headaches.  She was referred by Corinna Farfan NP for onabotulinumtoxinA injections for headache.  Risks, benefits, and alternatives were discussed.  All questions were answered and consent given.  She decided to proceed with the injections.     Her baseline headaches are a constant, deep ache and pressure accompanied by photophobia, phonophobia, nausea, lasting 1-2 days in duration.     Prior to initiation of botulinum toxin injections, Deepika reported 20/30 headache days per month, with 8/30 severe headache days per month. Her headaches are quite disabling and often interfere with her ability to function normally.       Date of last injections: 10/19/2023    Ms. Oliva reports 5 headache days per month currently, with 2 severe headache days per month.  She has noticed a wearing off phenomenon prior to this round of botulinum toxin injections, lasting 2 days.    Deepika reports the following benefits of botulinum toxin injections from their last round: She was able to enjoy the holidays and go out with family and stay up late with them.     She currently takes topiramate for headache prevention.     Patient's pain was assessed prior to the procedure. She rated her pain today as 4 out of 10.    Procedural Pause: Procedural pause was conducted to verify correct patient identity, procedure to be performed, correct side and site, correct patient position, and special  requirements. Appropriate hand hygiene was utilized, and each injection site was prepped with alcohol wipe or Chloraprep swab.     Procedure Details: 200 units of onabotulinumtoxinA was diluted in 4 mL 0.9% normal saline. A total of 150 units of onabotulinumtoxinA were injected using 30 gauge 0.5 in needles into the muscles listed below. 50 units of onabotulinumtoxinA were wasted.     Injection Sites: Total = 150 units onabotulinumtoxinA      and Procerus muscles - 5 units into the left and right corrugators and 5 units into the procerus (15 units total)    Frontalis muscles - 5 units into the left superior frontalis and 5 units into the right superior frontalis (2 injection sites per muscle) (10 units total)    Temporalis muscles - 12.5 units into the left temporalis muscle and 12.5 units into the right temporalis muscle (2 injection sites per muscle) (25 units total)    Occipitalis muscles - 12.5 units into the left occipitalis muscle and 12.5 units into the right occipitalis muscle (2 injection sites per muscle) (25 units total)    Splenius Capitis muscles - 12.5 units into the left splenius capitis muscle and 12.5 units into the right splenius capitis muscle (2 injection sites per muscle, divided into 2/3 anteriorly and 1/3 posteriorly) (25 units total)      Trapezius muscles - 25 units into the left trapezius muscle and 25 units into the right trapezius muscle (3 injection sites per muscle, divided 5 units, 10 units, 10 units, medial to lateral) (50 units total)    Ms. Oliva tolerated the procedure well without immediate complications.  She will follow up in clinic for assessment of the effectiveness of treatment.  She did not report any change in her pain level after the botulinumtoxinA injection procedure.    Phoebe Main MD  Headache Neurology  [facility]       Again, thank you for allowing me to participate in the care of your patient.        Sincerely,        Phoebe LUI  MD [place]

## 2024-01-14 ENCOUNTER — HEALTH MAINTENANCE LETTER (OUTPATIENT)
Age: 49
End: 2024-01-14

## 2024-02-09 DIAGNOSIS — G43.709 CHRONIC MIGRAINE WITHOUT AURA WITHOUT STATUS MIGRAINOSUS, NOT INTRACTABLE: ICD-10-CM

## 2024-02-09 RX ORDER — TOPIRAMATE 25 MG/1
TABLET, FILM COATED ORAL
Qty: 150 TABLET | Refills: 6 | Status: SHIPPED | OUTPATIENT
Start: 2024-02-09 | End: 2024-06-21

## 2024-02-09 NOTE — TELEPHONE ENCOUNTER
Rx Authorization:  Requested Medication/ Dose topiramate (TOPAMAX) 25 MG tablet   Date last refill ordered: 5/23/23  Quantity ordered: 150 tablet  # refills: 6  Date of last clinic visit with ordering provider: 4/27/23  Date of next clinic visit with ordering provider:   All pertinent protocol data (lab date/result):   Include pertinent information from patients message:

## 2024-02-26 DIAGNOSIS — G43.109 MIGRAINE WITH AURA AND WITHOUT STATUS MIGRAINOSUS, NOT INTRACTABLE: ICD-10-CM

## 2024-02-26 NOTE — TELEPHONE ENCOUNTER
RX Authorization    Medication: Rimegepant (NURTEC) 75 MG ODT tablet    Date last refill ordered: 2/9/2023    Quantity ordered: 8    # refills: 9    Date of last clinic visit with ordering provider: 4/27/2023    Date of next clinic visit with ordering provider:    All pertinent protocol data (lab date/result):    Include pertinent information from patients message:

## 2024-04-29 ENCOUNTER — OFFICE VISIT (OUTPATIENT)
Dept: NEUROLOGY | Facility: CLINIC | Age: 49
End: 2024-04-29
Payer: COMMERCIAL

## 2024-04-29 VITALS — HEART RATE: 94 BPM | SYSTOLIC BLOOD PRESSURE: 125 MMHG | DIASTOLIC BLOOD PRESSURE: 76 MMHG

## 2024-04-29 DIAGNOSIS — G43.709 CHRONIC MIGRAINE WITHOUT AURA WITHOUT STATUS MIGRAINOSUS, NOT INTRACTABLE: Primary | ICD-10-CM

## 2024-04-29 PROCEDURE — 99214 OFFICE O/P EST MOD 30 MIN: CPT | Performed by: PSYCHIATRY & NEUROLOGY

## 2024-04-29 ASSESSMENT — HEADACHE IMPACT TEST (HIT 6)
HIT6 TOTAL SCORE: 66
HOW OFTEN DO HEADACHES LIMIT YOUR DAILY ACTIVITIES: VERY OFTEN
WHEN YOU HAVE HEADACHES HOW OFTEN IS THE PAIN SEVERE: VERY OFTEN
WHEN YOU HAVE A HEADACHE HOW OFTEN DO YOU WISH YOU COULD LIE DOWN: VERY OFTEN
HOW OFTEN DID HEADACHS LIMIT CONCENTRATION ON WORK OR DAILY ACTIVITY: VERY OFTEN
HOW OFTEN HAVE YOU FELT TOO TIRED TO WORK BECAUSE OF YOUR HEADACHES: VERY OFTEN
HOW OFTEN HAVE YOU FELT FED UP OR IRRITATED BECAUSE OF YOUR HEADACHES: VERY OFTEN

## 2024-04-29 ASSESSMENT — MIGRAINE DISABILITY ASSESSMENT (MIDAS)
ON A SCALE FROM 0-10 ON AVERAGE HOW PAINFUL WERE HEADACHES: 7
HOW MANY DAYS IN THE PAST 3 MONTHS HAVE YOU HAD A HEADACHE: 60
TOTAL SCORE: 191
HOW MANY DAYS DID YOU MISS WORK OR SCHOOL BECAUSE OF HEADACHES: 12
HOW OFTEN WERE SOCIAL ACTIVITIES MISSED DUE TO HEADACHES: 60
HOW MANY DAYS WAS HOUSEWORK PRODUCTIVITY CUT IN HALF DUE TO HEADACHES: 60
HOW MANY DAYS WAS YOUR PRODUCTIVITY CUT IN HALF BECAUSE OF HEADACHES: 24
HOW MANY DAYS DID YOU NOT DO HOUSEWORK BECAUSE OF HEADACHES: 35
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
## 2024-05-01 NOTE — TELEPHONE ENCOUNTER
Rx Authorization:  Requested Medication/ Dose rizatriptan (MAXALT-MLT) 5 MG ODT   Date last refill ordered: 4/19/23  Quantity ordered: 18 tablet  # refills: 11  Date of last clinic visit with ordering provider: 4/27/23  Date of next clinic visit with ordering provider:   All pertinent protocol data (lab date/result):   Include pertinent information from patients message:      no

## 2024-05-03 ENCOUNTER — OFFICE VISIT (OUTPATIENT)
Dept: NEUROLOGY | Facility: CLINIC | Age: 49
End: 2024-05-03
Payer: COMMERCIAL

## 2024-05-03 VITALS — DIASTOLIC BLOOD PRESSURE: 77 MMHG | HEART RATE: 87 BPM | SYSTOLIC BLOOD PRESSURE: 118 MMHG

## 2024-05-03 DIAGNOSIS — G43.109 MIGRAINE WITH AURA AND WITHOUT STATUS MIGRAINOSUS, NOT INTRACTABLE: ICD-10-CM

## 2024-05-03 DIAGNOSIS — G43.709 CHRONIC MIGRAINE WITHOUT AURA WITHOUT STATUS MIGRAINOSUS, NOT INTRACTABLE: Primary | ICD-10-CM

## 2024-05-03 PROCEDURE — 64615 CHEMODENERV MUSC MIGRAINE: CPT | Performed by: PSYCHIATRY & NEUROLOGY

## 2024-05-03 NOTE — LETTER
5/3/2024         RE: Deepika Oliva  [address] [facility] [address]        Dear Colleague,    Thank you for referring your patient, Deepika Oliva, to the [facility] NEUROLOGY CLINIC [facility]. Please see a copy of my visit note below.    [facility]  Botulinum Toxin Procedure    Phoebe Main MD  Headache Neurology    May 3, 2024    Procedure:  OnabotulinumtoxinA injections for chronic migraine  Indication:  Chronic migraine    Ms. Oliva suffers from severe intractable headaches.  She was referred by Corinna Farfan NP for onabotulinumtoxinA injections for headache.  Risks, benefits, and alternatives were discussed.  All questions were answered and consent given.  She decided to proceed with the injections.      Her baseline headaches are a constant, deep ache and pressure accompanied by photophobia, phonophobia, nausea, lasting 1-2 days in duration.     Prior to initiation of botulinum toxin injections, Deepika reported 20/30 headache days per month, with 8/30 severe headache days per month. Her headaches are quite disabling and often interfere with her ability to function normally.    Date of last injections: 1/11/2024    Ms. Oliva reports 5 headache days per month currently, with 2 severe headache days per month.  She has noticed a wearing off phenomenon prior to this round of botulinum toxin injections, lasting 4 weeks.    Deepika reports the following benefits of botulinum toxin injections from their last round: She was able to enjoy the holidays and go out with family and stay up late with them.     She currently takes topiramate for headache prevention.    Ms. Oliva's pain was assessed prior to the procedure.  She rated her pain today as 2 out of 10.    Procedural Pause: Procedural pause was conducted to verify correct patient identity, procedure to be performed, correct side and site, correct patient position, and special  requirements. Appropriate hand hygiene was utilized, and each injection site was prepped with alcohol wipe or Chloraprep swab.     Procedure Details: 200 units of onabotulinumtoxinA was diluted in 4 mL 0.9% normal saline. A total of 200 units of onabotulinumtoxinA were injected using 30 gauge 0.5 in needles into the muscles listed below. 0 units of onabotulinumtoxinA were wasted.     Injection Sites: Total = 200 units onabotulinumtoxinA      and Procerus muscles - 5 units into the left and right corrugators and 5 units into the procerus (15 units total)    Frontalis muscles - 5 units into the left superior frontalis and 5 units into the right superior frontalis (2 injection sites per muscle) (10 units total)    Temporalis muscles -  25 units into the left temporalis muscle and 25 units into the right temporalis muscle (2 injection sites per muscle) (50 units total)    Occipitalis muscles - 25 units into the left occipitalis muscle and 25 units into the right occipitalis muscle (2 injection sites per muscle) (50 units total)    Splenius Capitis muscles - 12.5 units into the left splenius capitis muscle and 12.5 units into the right splenius capitis muscle (2 injection sites per muscle, divided into 2/3 anteriorly and 1/3 posteriorly) (25 units total)      Trapezius muscles - 25 units into the left trapezius muscle and 25 units into the right trapezius muscle (3 injection sites per muscle, divided 5 units, 10 units, 10 units, medial to lateral) (50 units total)    Ms. Oliva tolerated the procedure well without immediate complications.  She will follow up in clinic for assessment of the effectiveness of treatment.  She did not report any change in her pain level after the botulinumtoxinA injection procedure.    Phoebe Main MD  Headache Neurology  [facility]       Again, thank you for allowing me to participate in the care of your patient.        Sincerely,        Phoebe Main,  MD

## 2024-05-03 NOTE — PROGRESS NOTES
[facility]  Botulinum Toxin Procedure    Phoebe Main MD  Headache Neurology    May 3, 2024    Procedure:  OnabotulinumtoxinA injections for chronic migraine  Indication:  Chronic migraine    Ms. Oliva suffers from severe intractable headaches.  She was referred by Corinna Farfan NP for onabotulinumtoxinA injections for headache.  Risks, benefits, and alternatives were discussed.  All questions were answered and consent given.  She decided to proceed with the injections.      Her baseline headaches are a constant, deep ache and pressure accompanied by photophobia, phonophobia, nausea, lasting 1-2 days in duration.     Prior to initiation of botulinum toxin injections, Deepika reported 20/30 headache days per month, with 8/30 severe headache days per month. Her headaches are quite disabling and often interfere with her ability to function normally.    Date of last injections: 1/11/2024    Ms. Oliva reports 5 headache days per month currently, with 2 severe headache days per month.  She has noticed a wearing off phenomenon prior to this round of botulinum toxin injections, lasting 4 weeks.    Deepika reports the following benefits of botulinum toxin injections from their last round: She was able to enjoy the holidays and go out with family and stay up late with them.     She currently takes topiramate for headache prevention.    Ms. Oliva's pain was assessed prior to the procedure.  She rated her pain today as 2 out of 10.    Procedural Pause: Procedural pause was conducted to verify correct patient identity, procedure to be performed, correct side and site, correct patient position, and special requirements. Appropriate hand hygiene was utilized, and each injection site was prepped with alcohol wipe or Chloraprep swab.     Procedure Details: 200 units of onabotulinumtoxinA was diluted in 4 mL 0.9% normal saline. A total of 200 units of onabotulinumtoxinA were injected using 30 gauge 0.5  in needles into the muscles listed below. 0 units of onabotulinumtoxinA were wasted.     Injection Sites: Total = 200 units onabotulinumtoxinA      and Procerus muscles - 5 units into the left and right corrugators and 5 units into the procerus (15 units total)    Frontalis muscles - 5 units into the left superior frontalis and 5 units into the right superior frontalis (2 injection sites per muscle) (10 units total)    Temporalis muscles -  25 units into the left temporalis muscle and 25 units into the right temporalis muscle (2 injection sites per muscle) (50 units total)    Occipitalis muscles - 25 units into the left occipitalis muscle and 25 units into the right occipitalis muscle (2 injection sites per muscle) (50 units total)    Splenius Capitis muscles - 12.5 units into the left splenius capitis muscle and 12.5 units into the right splenius capitis muscle (2 injection sites per muscle, divided into 2/3 anteriorly and 1/3 posteriorly) (25 units total)      Trapezius muscles - 25 units into the left trapezius muscle and 25 units into the right trapezius muscle (3 injection sites per muscle, divided 5 units, 10 units, 10 units, medial to lateral) (50 units total)    Ms. Oliva tolerated the procedure well without immediate complications.  She will follow up in clinic for assessment of the effectiveness of treatment.  She did not report any change in her pain level after the botulinumtoxinA injection procedure.    Phoebe Main MD  Headache Neurology  [facility]

## 2024-05-03 NOTE — TELEPHONE ENCOUNTER
Rx Authorization:  Requested Medication/ Dose rizatriptan (MAXALT-MLT) 5 MG ODT   Date last refill ordered: 4/19/24  Quantity ordered: 18 tablet  # refills: 11  Date of last clinic visit with ordering provider: 4/27/23  Date of next clinic visit with ordering provider:   All pertinent protocol data (lab date/result):   Include pertinent information from patients message:

## 2024-05-07 RX ORDER — RIZATRIPTAN BENZOATE 5 MG/1
5-10 TABLET, ORALLY DISINTEGRATING ORAL
Qty: 18 TABLET | Refills: 5 | Status: SHIPPED | OUTPATIENT
Start: 2024-05-07

## 2024-06-21 ENCOUNTER — OFFICE VISIT (OUTPATIENT)
Dept: NEUROLOGY | Facility: CLINIC | Age: 49
End: 2024-06-21
Payer: COMMERCIAL

## 2024-06-21 VITALS — DIASTOLIC BLOOD PRESSURE: 82 MMHG | SYSTOLIC BLOOD PRESSURE: 127 MMHG | HEART RATE: 94 BPM

## 2024-06-21 DIAGNOSIS — G43.709 CHRONIC MIGRAINE WITHOUT AURA WITHOUT STATUS MIGRAINOSUS, NOT INTRACTABLE: ICD-10-CM

## 2024-06-21 PROCEDURE — 99214 OFFICE O/P EST MOD 30 MIN: CPT | Performed by: PSYCHIATRY & NEUROLOGY

## 2024-06-21 RX ORDER — ACETAMINOPHEN 160 MG
1 TABLET,DISINTEGRATING ORAL DAILY
COMMUNITY
Start: 2024-05-13

## 2024-06-21 RX ORDER — PLECANATIDE 3 MG/1
1 TABLET ORAL DAILY
COMMUNITY
Start: 2024-05-13

## 2024-06-21 RX ORDER — MAGNESIUM GLYCINATE 100 MG
1 TABLET ORAL DAILY
COMMUNITY
Start: 2023-01-12

## 2024-06-21 RX ORDER — DEXTROAMPHETAMINE SACCHARATE, AMPHETAMINE ASPARTATE, DEXTROAMPHETAMINE SULFATE AND AMPHETAMINE SULFATE 1.25; 1.25; 1.25; 1.25 MG/1; MG/1; MG/1; MG/1
TABLET ORAL
COMMUNITY
Start: 2024-05-13

## 2024-06-21 RX ORDER — METHYLPHENIDATE HYDROCHLORIDE 27 MG/1
27 TABLET, EXTENDED RELEASE ORAL
COMMUNITY
Start: 2024-06-06

## 2024-06-21 RX ORDER — TOPIRAMATE 100 MG/1
100 TABLET, FILM COATED ORAL DAILY
Qty: 30 TABLET | Refills: 6 | Status: SHIPPED | OUTPATIENT
Start: 2024-06-21

## 2024-06-21 RX ORDER — BENZALKONIUM CHLORIDE 1.3 MG/ML
CLOTH TOPICAL
COMMUNITY
Start: 2023-08-31

## 2024-06-21 RX ORDER — TIZANIDINE 2 MG/1
TABLET ORAL
COMMUNITY
Start: 2024-06-04

## 2024-06-21 RX ORDER — TRAZODONE HYDROCHLORIDE 50 MG/1
1 TABLET, FILM COATED ORAL AT BEDTIME
COMMUNITY
Start: 2024-05-13
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
## 2024-06-21 NOTE — NURSING NOTE
Chief Complaint   Patient presents with    Follow Up     Headaches       SUPRIYA Mcfarland on 6/21/2024 at 10:52 AM

## 2024-07-02 DIAGNOSIS — G43.109 MIGRAINE WITH AURA AND WITHOUT STATUS MIGRAINOSUS, NOT INTRACTABLE: ICD-10-CM

## 2024-07-03 ENCOUNTER — TELEPHONE (OUTPATIENT)
Dept: NEUROLOGY | Facility: CLINIC | Age: 49
End: 2024-07-03
Payer: COMMERCIAL

## 2024-07-03 NOTE — TELEPHONE ENCOUNTER
Prior Authorization Retail Medication Request    Medication/Dose: NURTEC 75MG ODT TABLETS  Diagnosis and ICD code (if different than what is on RX):  see chart  New/renewal/insurance change PA/secondary ins. PA:  Previously Tried and Failed:  see chart  Rationale:  see chart    Insurance   Primary: [employer]  Insurance ID:  [id number]    Secondary (if applicable):none  Insurance ID:  none    Pharmacy Information (if different than what is on RX)  Name:  [facility]  Phone:  [phone]  Fax:[phone]

## 2024-07-09 NOTE — TELEPHONE ENCOUNTER
Central Prior Authorization Team   Phone: [phone]    PA Initiation    Medication: NURTEC 75MG ODT TABLETS  Insurance Company: [employer] - Phone [phone] Fax [phone]  Pharmacy Filling the Rx: [facility] - [place], [place] - [address] [place] [address]  Filling Pharmacy Phone: [phone]  Filling Pharmacy Fax:    Start Date: 7/9/2024

## 2024-07-09 NOTE — TELEPHONE ENCOUNTER
Prior Authorization Not Needed per Insurance    Medication: NURTEC 75MG ODT TABLETS  Insurance Company: [employer] - Phone [phone] Fax [phone]  Expected CoPay:      Pharmacy Filling the Rx: [id number] - [place], [place] - [address] AT [facility] [address] [facility]  Pharmacy Notified:  yes  Patient Notified:  yes      PA approval on file for Nurtec 75mg ODT, effective through 7/3/2025  Per call to [facility] pharmacy they filled this for patient on 7/3, no further action needed

## 2024-08-02 ENCOUNTER — OFFICE VISIT (OUTPATIENT)
Dept: NEUROLOGY | Facility: CLINIC | Age: 49
End: 2024-08-02
Payer: COMMERCIAL

## 2024-08-02 VITALS — DIASTOLIC BLOOD PRESSURE: 76 MMHG | HEART RATE: 96 BPM | SYSTOLIC BLOOD PRESSURE: 115 MMHG

## 2024-08-02 DIAGNOSIS — G43.709 CHRONIC MIGRAINE WITHOUT AURA WITHOUT STATUS MIGRAINOSUS, NOT INTRACTABLE: ICD-10-CM

## 2024-08-02 DIAGNOSIS — G43.109 MIGRAINE WITH AURA AND WITHOUT STATUS MIGRAINOSUS, NOT INTRACTABLE: Primary | ICD-10-CM

## 2024-08-02 PROCEDURE — 64615 CHEMODENERV MUSC MIGRAINE: CPT | Performed by: PSYCHIATRY & NEUROLOGY

## 2024-08-02 NOTE — LETTER
8/2/2024      Deepika Oliva  [address] [facility] [address]      Dear Colleague,    Thank you for referring your patient, Deepika Oliva, to the [facility] NEUROLOGY CLINIC [facility]. Please see a copy of my visit note below.    [facility]  Botulinum Toxin Procedure    Phoebe Main MD  Headache Neurology    August 2, 2024    Procedure:  OnabotulinumtoxinA injections for chronic migraine  Indication:  Chronic migraine    Ms. Oliva suffers from severe intractable headaches.  She was referred by Corinna Farfan NP for onabotulinumtoxinA injections for headache.  Risks, benefits, and alternatives were discussed.  All questions were answered and consent given.  She decided to proceed with the injections.      Her baseline headaches are a constant, deep ache and pressure accompanied by photophobia, phonophobia, nausea, lasting 1-2 days in duration.     Prior to initiation of botulinum toxin injections, Deepika reported 20/30 headache days per month, with 8/30 severe headache days per month. Her headaches are quite disabling and often interfere with her ability to function normally.    Date of last injections: 5/3/2024    Ms. Oliva reports 2-3 headache days per month currently, with 2 severe headache days per month.  She has noticed a wearing off phenomenon prior to this round of botulinum toxin injections, lasting 4 weeks.    Botulinum toxin injections have improved their functioning. She was able to travel, made plans and went out to lunch. She is up from 3am-7am doing activities around the house and then nap thereafter.    Deepika reports the following benefits of botulinum toxin injections from their last round: She was able to enjoy the holidays and go out with family and stay up late with them.      Previously tried duloxetine, amitriptyline, and verapamil for prevention of chronic migraine without benefit.     She currently takes topiramate  for headache prevention.    Ms. Oliva's pain was assessed prior to the procedure.  She rated her pain today as 3 out of 10.    Procedural Pause: Procedural pause was conducted to verify correct patient identity, procedure to be performed, correct side and site, correct patient position, and special requirements. Appropriate hand hygiene was utilized, and each injection site was prepped with alcohol wipe or Chloraprep swab.     Procedure Details: 200 units of onabotulinumtoxinA was diluted in 4 mL 0.9% normal saline. A total of 200 units of onabotulinumtoxinA were injected using 30 gauge 0.5 in needles into the muscles listed below. 0 units of onabotulinumtoxinA were wasted.     Injection Sites: Total = 200 units onabotulinumtoxinA      and Procerus muscles - 5 units into the left and right corrugators and 5 units into the procerus (15 units total)    Frontalis muscles - 5 units into the left superior frontalis and 5 units into the right superior frontalis (2 injection sites per muscle) (10 units total)    Temporalis muscles - 25 units into the left temporalis muscle and 25 units into the right temporalis muscle (4 injection sites per muscle) (50 units total)    Occipitalis muscles - 25 units into the left occipitalis muscle and 25 units into the right occipitalis muscle (4 injection sites per muscle) (50 units total)    Splenius Capitis muscles - 12.5 units into the left splenius capitis muscle and 12.5 units into the right splenius capitis muscle (2 injection sites per muscle, divided into 2/3 anteriorly and 1/3 posteriorly) (25 units total)      **Trapezius muscles - 15 units into the left trapezius muscle and 15 units into the right trapezius muscle (3 injection sites per muscle, divided 5 units, 10 units, 10 units, medial to lateral) ( 30 units total)    **Masseters: 5 units in the left masseter muscle and 5 units in the right masseter muscle (1 injection site per muscle)    Ms. Oliva tolerated  the procedure well without immediate complications.  She will follow up in clinic for assessment of the effectiveness of treatment.  She did not report any change in her pain level after the botulinumtoxinA injection procedure.    Phoebe Main MD  Headache Neurology  [facility]       Again, thank you for allowing me to participate in the care of your patient.        Sincerely,        Phoebe Main MD

## 2024-10-25 ENCOUNTER — OFFICE VISIT (OUTPATIENT)
Dept: NEUROLOGY | Facility: CLINIC | Age: 49
End: 2024-10-25
Payer: COMMERCIAL

## 2024-10-25 VITALS — HEART RATE: 79 BPM | DIASTOLIC BLOOD PRESSURE: 82 MMHG | SYSTOLIC BLOOD PRESSURE: 130 MMHG | OXYGEN SATURATION: 98 %

## 2024-10-25 DIAGNOSIS — G43.709 CHRONIC MIGRAINE WITHOUT AURA WITHOUT STATUS MIGRAINOSUS, NOT INTRACTABLE: Primary | ICD-10-CM

## 2024-10-25 PROCEDURE — 64615 CHEMODENERV MUSC MIGRAINE: CPT | Performed by: PSYCHIATRY & NEUROLOGY

## 2024-10-25 RX ORDER — PROCHLORPERAZINE MALEATE 10 MG
10 TABLET ORAL EVERY 6 HOURS PRN
Qty: 20 TABLET | Refills: 3 | Status: SHIPPED | OUTPATIENT
Start: 2024-10-25

## 2024-10-25 NOTE — PROGRESS NOTES
[facility]  Botulinum Toxin Procedure    Phoebe Main MD  Headache Neurology    October 25, 2024    Procedure:  OnabotulinumtoxinA injections for chronic migraine  Indication:  Chronic migraine    Ms. Oliva suffers from severe intractable headaches.  She was referred by Corinna Farfan NP for onabotulinumtoxinA injections for headache.  Risks, benefits, and alternatives were discussed.  All questions were answered and consent given.  She decided to proceed with the injections.      Her baseline headaches are a constant, deep ache and pressure accompanied by photophobia, phonophobia, nausea, lasting 1-2 days in duration.     Prior to initiation of botulinum toxin injections, Deepika reported 20/30 headache days per month, with 8/30 severe headache days per month. Her headaches are quite disabling and often interfere with her ability to function normally.    Date of last injections: 8/2/2024    Ms. Oliva reports  0 headache days per month currently, with 0 severe headache days per month.  She has noticed a wearing off phenomenon prior to this round of botulinum toxin injections, lasting 2 weeks. In the last 2 weeks, 6 days per week and she has noticed that she has had severity as 8/10 and she was limiting her activity.    Botulinum toxin injections have improved their functioning. She can get groceries and run errands  and socialize about 20 more days per month when Botox is working.    Previously tried duloxetine, amitriptyline, and verapamil for prevention of chronic migraine without benefit.     She currently takes topiramate for headache prevention.    Ms. Oliva's pain was assessed prior to the procedure.  She rated her pain today as 2 out of 10.    Procedural Pause: Procedural pause was conducted to verify correct patient identity, procedure to be performed, correct side and site, correct patient position, and special requirements. Appropriate hand hygiene was utilized, and each  injection site was prepped with alcohol wipe or Chloraprep swab.     Procedure Details: 200 units of onabotulinumtoxinA was diluted in 4 mL 0.9% normal saline. A total of 200 units of onabotulinumtoxinA were injected using 30 gauge 0.5 in needles into the muscles listed below. 0 units of onabotulinumtoxinA were wasted.      Injection Sites: Total = 200 units onabotulinumtoxinA       and Procerus muscles - 5 units into the left and right corrugators and 5 units into the procerus (15 units total)     Frontalis muscles - 5 units into the left superior frontalis and 5 units into the right superior frontalis (2 injection sites per muscle) (10 units total)     Temporalis muscles - 25 units into the left temporalis muscle and 25 units into the right temporalis muscle (4 injection sites per muscle) (50 units total)     Occipitalis muscles - 25 units into the left occipitalis muscle and 25 units into the right occipitalis muscle (4 injection sites per muscle) (50 units total)     Splenius Capitis muscles - 12.5 units into the left splenius capitis muscle and 12.5 units into the right splenius capitis muscle (2 injection sites per muscle, divided into 2/3 anteriorly and 1/3 posteriorly) (25 units total)                 **Trapezius muscles - 15 units into the left trapezius muscle and 15 units into the right trapezius muscle (3 injection sites per muscle, divided 5 units, 10 units, 10 units, medial to lateral) ( 30 units total)     **Masseters: 5 units in the left masseter muscle and 5 units in the right masseter muscle (1 injection site per muscle)    Ms. Oliva tolerated the procedure well without immediate complications.  She will follow up in clinic for assessment of the effectiveness of treatment.  She did not report any change in her pain level after the botulinumtoxinA injection procedure.    Phoebe Main MD  Headache Neurology  [facility]

## 2024-10-25 NOTE — LETTER
10/25/2024      Deepika Oliva  [address] [address]      Dear Colleague,    Thank you for referring your patient, Deepika Oliva, to the [facility] NEUROLOGY CLINIC [facility]. Please see a copy of my visit note below.    [facility]  Botulinum Toxin Procedure    Phoebe Main MD  Headache Neurology    October 25, 2024    Procedure:  OnabotulinumtoxinA injections for chronic migraine  Indication:  Chronic migraine    Ms. Oliva suffers from severe intractable headaches.  She was referred by Corinna Farfan NP for onabotulinumtoxinA injections for headache.  Risks, benefits, and alternatives were discussed.  All questions were answered and consent given.  She decided to proceed with the injections.      Her baseline headaches are a constant, deep ache and pressure accompanied by photophobia, phonophobia, nausea, lasting 1-2 days in duration.     Prior to initiation of botulinum toxin injections, Deepika reported 20/30 headache days per month, with 8/30 severe headache days per month. Her headaches are quite disabling and often interfere with her ability to function normally.    Date of last injections: 8/2/2024    Ms. Oliva reports  0 headache days per month currently, with 0 severe headache days per month.  She has noticed a wearing off phenomenon prior to this round of botulinum toxin injections, lasting 2 weeks. In the last 2 weeks, 6 days per week and she has noticed that she has had severity as 8/10 and she was limiting her activity.    Botulinum toxin injections have improved their functioning. She can get groceries and run errands  and socialize about 20 more days per month when Botox is working.    Previously tried duloxetine, amitriptyline, and verapamil for prevention of chronic migraine without benefit.     She currently takes topiramate for headache prevention.    Ms. Oliva's pain was assessed prior to the procedure.  She  rated her pain today as 2 out of 10.    Procedural Pause: Procedural pause was conducted to verify correct patient identity, procedure to be performed, correct side and site, correct patient position, and special requirements. Appropriate hand hygiene was utilized, and each injection site was prepped with alcohol wipe or Chloraprep swab.     Procedure Details: 200 units of onabotulinumtoxinA was diluted in 4 mL 0.9% normal saline. A total of 200 units of onabotulinumtoxinA were injected using 30 gauge 0.5 in needles into the muscles listed below. 0 units of onabotulinumtoxinA were wasted.      Injection Sites: Total = 200 units onabotulinumtoxinA       and Procerus muscles - 5 units into the left and right corrugators and 5 units into the procerus (15 units total)     Frontalis muscles - 5 units into the left superior frontalis and 5 units into the right superior frontalis (2 injection sites per muscle) (10 units total)     Temporalis muscles - 25 units into the left temporalis muscle and 25 units into the right temporalis muscle (4 injection sites per muscle) (50 units total)     Occipitalis muscles - 25 units into the left occipitalis muscle and 25 units into the right occipitalis muscle (4 injection sites per muscle) (50 units total)     Splenius Capitis muscles - 12.5 units into the left splenius capitis muscle and 12.5 units into the right splenius capitis muscle (2 injection sites per muscle, divided into 2/3 anteriorly and 1/3 posteriorly) (25 units total)                 **Trapezius muscles - 15 units into the left trapezius muscle and 15 units into the right trapezius muscle (3 injection sites per muscle, divided 5 units, 10 units, 10 units, medial to lateral) ( 30 units total)     **Masseters: 5 units in the left masseter muscle and 5 units in the right masseter muscle (1 injection site per muscle)    Ms. Olvia tolerated the procedure well without immediate complications.  She will follow up  in clinic for assessment of the effectiveness of treatment.  She did not report any change in her pain level after the botulinumtoxinA injection procedure.    Phoebe Main MD  Headache Neurology  [facility]       Again, thank you for allowing me to participate in the care of your patient.        Sincerely,        Phoebe Main MD

## 2024-10-25 NOTE — NURSING NOTE
Chief Complaint   Patient presents with    Botox       SUPRIYA Mcfarland on 10/25/2024 at 1:15 PM

## 2025-01-06 ENCOUNTER — MYC REFILL (OUTPATIENT)
Dept: NEUROLOGY | Facility: CLINIC | Age: 50
End: 2025-01-06
Payer: COMMERCIAL

## 2025-01-06 DIAGNOSIS — G43.709 CHRONIC MIGRAINE WITHOUT AURA WITHOUT STATUS MIGRAINOSUS, NOT INTRACTABLE: ICD-10-CM

## 2025-01-07 RX ORDER — TOPIRAMATE 100 MG/1
100 TABLET, FILM COATED ORAL DAILY
Qty: 90 TABLET | Refills: 3 | Status: SHIPPED | OUTPATIENT
Start: 2025-01-07

## 2025-01-07 NOTE — TELEPHONE ENCOUNTER
Refill request for the following medication (s) listed below.    Pending Prescriptions:                       Disp   Refills    topiramate (TOPAMAX) 100 MG tablet        30 tab*6            Sig: Take 1 tablet (100 mg) by mouth daily.      Last office visit provider:  10/25/24  Next appointment scheduled: 1/17/25    Pt is requesting 90 day supply.      Medication T'd for review and signature  Antony DEGROOT ATC on 1/7/2025 at 8:14 AM

## 2025-02-15 ENCOUNTER — ANCILLARY PROCEDURE (OUTPATIENT)
Dept: GENERAL RADIOLOGY | Facility: CLINIC | Age: 50
End: 2025-02-15
Attending: PHYSICIAN ASSISTANT
Payer: COMMERCIAL

## 2025-02-15 ENCOUNTER — OFFICE VISIT (OUTPATIENT)
Dept: URGENT CARE | Facility: URGENT CARE | Age: 50
End: 2025-02-15
Payer: COMMERCIAL

## 2025-02-15 VITALS
DIASTOLIC BLOOD PRESSURE: 79 MMHG | HEART RATE: 78 BPM | OXYGEN SATURATION: 95 % | RESPIRATION RATE: 18 BRPM | TEMPERATURE: 98 F | SYSTOLIC BLOOD PRESSURE: 125 MMHG

## 2025-02-15 DIAGNOSIS — J01.90 ACUTE SINUSITIS WITH SYMPTOMS > 10 DAYS: ICD-10-CM

## 2025-02-15 DIAGNOSIS — J02.9 SORE THROAT: ICD-10-CM

## 2025-02-15 DIAGNOSIS — R05.1 ACUTE COUGH: ICD-10-CM

## 2025-02-15 DIAGNOSIS — R05.1 ACUTE COUGH: Primary | ICD-10-CM

## 2025-02-15 LAB
DEPRECATED S PYO AG THROAT QL EIA: NEGATIVE
S PYO DNA THROAT QL NAA+PROBE: NOT DETECTED

## 2025-02-15 PROCEDURE — 87651 STREP A DNA AMP PROBE: CPT | Performed by: PHYSICIAN ASSISTANT

## 2025-02-15 PROCEDURE — 99214 OFFICE O/P EST MOD 30 MIN: CPT | Performed by: PHYSICIAN ASSISTANT

## 2025-02-15 PROCEDURE — 71046 X-RAY EXAM CHEST 2 VIEWS: CPT | Mod: TC | Performed by: RADIOLOGY

## 2025-02-15 RX ORDER — ALBUTEROL SULFATE 90 UG/1
2 INHALANT RESPIRATORY (INHALATION) EVERY 6 HOURS PRN
Qty: 18 G | Refills: 0 | Status: SHIPPED | OUTPATIENT
Start: 2025-02-15

## 2025-02-15 NOTE — PROGRESS NOTES
Assessment & Plan     Acute cough  Xray came back normal.Oxygen is normal.  Patient is not in respiratory distress. Detailed discussion with patient today about her new SOB. Its sounds related to cough, no sign of PE and PERC negative. Could be related to this recent viral URI. Albuterol given to use as needed to help with SOB and cough. If her cough and SOB persist then follow up with PCP for next steps. Warning signs to go to ER educated to patient. Patient agree's with this plan and has no further questions  - XR Chest 2 Views; Future  - albuterol (PROAIR HFA/PROVENTIL HFA/VENTOLIN HFA) 108 (90 Base) MCG/ACT inhaler; Inhale 2 puffs into the lungs every 6 hours as needed for shortness of breath, wheezing or cough.    Sore throat  Negative for strep.   - Streptococcus A Rapid Screen w/Reflex to PCR - Clinic Collect  - Group A Streptococcus PCR Throat Swab    Acute sinusitis with symptoms > 10 days  Discussed with patient symptoms are consistent with sinus infection. Symptoms have been persisting long enough where an antibiotic would be warranted. Side effects of medication discussed. In addition I recommend warm compress's over the sinus's, nasal sprays, and sinus rinses. OTC pain relievers as needed. If symptoms do not improve after completing antibiotic then please follow up in clinic or sooner if symptoms worsen   - amoxicillin-clavulanate (AUGMENTIN) 875-125 MG tablet; Take 1 tablet by mouth 2 times daily for 7 days.                No follow-ups on file.    Subjective   Deepika is a 49 year old, presenting for the following health issues:  Cough (Cough fatigue and shortness of breath concerns for pneumonia x 1 week. Back pain )    HPI       Acute Illness  Acute illness concerns: URI   Onset/Duration: a few weeks - slowly worsening over this time  Symptoms:  Fever: No  Chills/Sweats: difficult to say cause she is going through menopause   Headache (location?): YES  Sinus Pressure: YES  Conjunctivitis:   No  Ear Pain: YES- left ear   Rhinorrhea: YES  Congestion: No  Sore Throat: YES  Cough: YES - mild, nonproductive, worse in the morning, SOB was with activity but now its been getting worse with just at rest. No chest pain  Wheeze: No  Decreased Appetite: No  Nausea: No  Vomiting: No  Diarrhea: No  Dysuria/Freq.: No  Dysuria or Hematuria: No  Fatigue/Achiness: YES  Sick/Strep Exposure:    Therapies tried and outcome: tylenol   Back pain in the last week - worse on the right side,  and worse with laying down.   No at home swabs.           Review of Systems  Constitutional, HEENT, cardiovascular, pulmonary, gi and gu systems are negative, except as otherwise noted.      Objective    /79   Pulse 78   Temp 98  F (36.7  C)   Resp 18   SpO2 95%   There is no height or weight on file to calculate BMI.  Physical Exam  Constitutional:       Appearance: Normal appearance.   HENT:      Right Ear: Tympanic membrane and ear canal normal.      Left Ear: Tympanic membrane and ear canal normal.      Nose: No congestion or rhinorrhea.      Right Sinus: No maxillary sinus tenderness or frontal sinus tenderness.      Left Sinus: No maxillary sinus tenderness or frontal sinus tenderness.      Mouth/Throat:      Mouth: Mucous membranes are moist.      Pharynx: Oropharynx is clear. Posterior oropharyngeal erythema present.      Comments: Postnasal drainage  Cardiovascular:      Rate and Rhythm: Normal rate and regular rhythm.   Pulmonary:      Effort: Pulmonary effort is normal.      Breath sounds: Normal breath sounds.   Chest:      Comments: Tender to palpation of bilateral thoracic back   Lymphadenopathy:      Cervical: No cervical adenopathy.   Neurological:      Mental Status: She is alert.   Psychiatric:         Mood and Affect: Mood normal.         Behavior: Behavior normal.            Results for orders placed or performed in visit on 02/15/25   XR Chest 2 Views     Status: None    Narrative    EXAM: XR CHEST 2  VIEWS  LOCATION: [facility]  DATE: 2/15/2025    INDICATION:  Acute cough  COMPARISON: Chest radiograph 2/28/2021.      Impression    IMPRESSION: No evidence of acute cardiopulmonary disease. ACDF hardware is noted.   Results for orders placed or performed in visit on 02/15/25   Streptococcus A Rapid Screen w/Reflex to PCR - Clinic Collect     Status: Normal    Specimen: Throat; Swab   Result Value Ref Range    Group A Strep antigen Negative Negative     No results found for this visit on 02/15/25.        Signed Electronically by: BEULAH Figueroa

## 2025-02-15 NOTE — PATIENT INSTRUCTIONS

## 2025-04-28 ENCOUNTER — OFFICE VISIT (OUTPATIENT)
Dept: NEUROLOGY | Facility: CLINIC | Age: 50
End: 2025-04-28
Payer: COMMERCIAL

## 2025-04-28 VITALS — HEART RATE: 87 BPM | SYSTOLIC BLOOD PRESSURE: 111 MMHG | DIASTOLIC BLOOD PRESSURE: 78 MMHG

## 2025-04-28 DIAGNOSIS — G43.709 CHRONIC MIGRAINE WITHOUT AURA WITHOUT STATUS MIGRAINOSUS, NOT INTRACTABLE: Primary | ICD-10-CM

## 2025-04-28 PROCEDURE — 99212 OFFICE O/P EST SF 10 MIN: CPT | Performed by: PSYCHIATRY & NEUROLOGY

## 2025-04-28 PROCEDURE — 3078F DIAST BP <80 MM HG: CPT | Performed by: PSYCHIATRY & NEUROLOGY

## 2025-04-28 PROCEDURE — G2211 COMPLEX E/M VISIT ADD ON: HCPCS | Performed by: PSYCHIATRY & NEUROLOGY

## 2025-04-28 PROCEDURE — 3074F SYST BP LT 130 MM HG: CPT | Performed by: PSYCHIATRY & NEUROLOGY

## 2025-04-28 RX ORDER — TENAPANOR HYDROCHLORIDE 53.2 MG/1
50 TABLET ORAL 2 TIMES DAILY
COMMUNITY

## 2025-04-28 NOTE — LETTER
4/28/2025      Deepika Oliva  [address] [address]      Dear Colleague,    Thank you for referring your patient, Deepika Oliva, to the [facility] NEUROLOGY CLINIC [facility]. Please see a copy of my visit note below.    [facility]    Headache Neurology Progress Note  April 28, 2025    Assessment/Plan:   Deepika Oliva is a 49 year old with CKD stage 3 who presents today for return evaluation of headaches. No word finding difficulties. No changes made to the plan below at this time.     Headache treatment plan:  Acute medication recommendations:  -Rizatriptan 10mg, limit to no more than 9 days per month of use   -Nurtec at headache onset to see if this is more effective (rescue during wearing off period) - every other day during the wearing off period  Compazine for nausea      Preventative medication recommendations:  Botox to 200mg every 12 weeks, on chronic migraine protocol   Topiramate 100mg daily; no dose adjustment based on renal function at this time (greater than 70 ml/min)    The longitudinal plan of care for Deepika was addressed during this visit. Due to the added complexity in care, I will continue to support Deepika in the subsequent management of this condition(s) and with the ongoing continuity of care of this condition(s).    Phoebe Main MD  Neurology     Subjective:    Deepika Oliva returns for follow up of chronic migraine. This round has gone better.     The last couple of weeks have been better and had 1. She had surgery at the end of December and otherwise improving. She was down from that in January and up and walking in February and fully recovered from knee surgery in March. No other triggering factors noted for the March worsening of headaches.     Rizatriptan continues to be helpful. She takes 2. She had nausea in March. She tried compazine and this was helpful. Nurtec was  helpful as well.    She has had some nausea so will not eat, but also endorses feeling full quickly.    Objective:    Vitals: /78 (BP Location: Right arm, Patient Position: Sitting, Cuff Size: Adult Regular)   Pulse 87   General: Cooperative, NAD  Neurologic:  Mental Status: Fully alert, attentive and oriented. Speech clear and fluent.   Cranial Nerves: Facial movements symmetric.   Motor: No abnormal movements.      Pertinent Investigations:          12/1/2022     2:40 PM 4/27/2023    10:14 AM 4/29/2024     2:43 PM   HIT-6   When you have headaches, how often is the pain severe 11 10 11   How often do headaches limit your ability to do usual daily activities including household work, work, school, or social activities? 11 10 11   When you have a headache, how often do you wish you could lie down? 11 10 11   In the past 4 weeks, how often have you felt too tired to do work or daily activities because of your headaches 11 10 11   In the past 4 weeks, how often have you felt fed up or irritated because of your headaches 11 10 11   In the past 4 weeks, how often did headaches limit your ability to concentrate on work or daily activities 11 10 11   HIT-6 Total Score 66 60 66           12/1/2022     2:45 PM 4/27/2023    10:16 AM 4/29/2024     2:47 PM   MIDAS - in the past three months:   On how many days did you miss work or school because of your headaches? 30 3 12   How many days was your productivity at work or school reduced by half or more because of your headaches? 20 3 24   On how many days did you not do household work because of your headaches? 30 3 35   How many days was your productivity in household work reduced by half or more because of your headaches? 45 33 60   On how many days did you miss family, social, or leisure activities because of your headaches? 10 3 60   On how many days did you have a headache? 45 3 60   On a scale of 0-10, on average how painful were these headaches? 6 5 7   MIDAS Score  135 (IV - Severe Disability) 45 (IV - Severe Disability) 191 (IV - Severe Disability)           Again, thank you for allowing me to participate in the care of your patient.        Sincerely,        Phoebe Main MD    Electronically signed

## 2025-04-28 NOTE — PROGRESS NOTES
[facility]    Headache Neurology Progress Note  April 28, 2025    Assessment/Plan:   Deepika Oliva is a 49 year old with CKD stage 3 who presents today for return evaluation of headaches. No word finding difficulties. No changes made to the plan below at this time.     Headache treatment plan:  Acute medication recommendations:  -Rizatriptan 10mg, limit to no more than 9 days per month of use   -Nurtec at headache onset to see if this is more effective (rescue during wearing off period) - every other day during the wearing off period  Compazine for nausea      Preventative medication recommendations:  Botox to 200mg every 12 weeks, on chronic migraine protocol   Topiramate 100mg daily; no dose adjustment based on renal function at this time (greater than 70 ml/min)    The longitudinal plan of care for Deepika was addressed during this visit. Due to the added complexity in care, I will continue to support Deepika in the subsequent management of this condition(s) and with the ongoing continuity of care of this condition(s).    Phoebe Main MD  Neurology     Subjective:    Deepika Oliva returns for follow up of chronic migraine. This round has gone better.     The last couple of weeks have been better and had 1. She had surgery at the end of December and otherwise improving. She was down from that in January and up and walking in February and fully recovered from knee surgery in March. No other triggering factors noted for the March worsening of headaches.     Rizatriptan continues to be helpful. She takes 2. She had nausea in March. She tried compazine and this was helpful. Nurtec was helpful as well.    She has had some nausea so will not eat, but also endorses feeling full quickly.    Objective:    Vitals: /78 (BP Location: Right arm, Patient Position: Sitting, Cuff Size: Adult Regular)   Pulse 87   General: Cooperative, NAD  Neurologic:  Mental Status:  Fully alert, attentive and oriented. Speech clear and fluent.   Cranial Nerves: Facial movements symmetric.   Motor: No abnormal movements.      Pertinent Investigations:          12/1/2022     2:40 PM 4/27/2023    10:14 AM 4/29/2024     2:43 PM   HIT-6   When you have headaches, how often is the pain severe 11 10 11   How often do headaches limit your ability to do usual daily activities including household work, work, school, or social activities? 11 10 11   When you have a headache, how often do you wish you could lie down? 11 10 11   In the past 4 weeks, how often have you felt too tired to do work or daily activities because of your headaches 11 10 11   In the past 4 weeks, how often have you felt fed up or irritated because of your headaches 11 10 11   In the past 4 weeks, how often did headaches limit your ability to concentrate on work or daily activities 11 10 11   HIT-6 Total Score 66 60 66           12/1/2022     2:45 PM 4/27/2023    10:16 AM 4/29/2024     2:47 PM   MIDAS - in the past three months:   On how many days did you miss work or school because of your headaches? 30 3 12   How many days was your productivity at work or school reduced by half or more because of your headaches? 20 3 24   On how many days did you not do household work because of your headaches? 30 3 35   How many days was your productivity in household work reduced by half or more because of your headaches? 45 33 60   On how many days did you miss family, social, or leisure activities because of your headaches? 10 3 60   On how many days did you have a headache? 45 3 60   On a scale of 0-10, on average how painful were these headaches? 6 5 7   MIDAS Score 135 (IV - Severe Disability) 45 (IV - Severe Disability) 191 (IV - Severe Disability)

## 2025-05-04 ENCOUNTER — HEALTH MAINTENANCE LETTER (OUTPATIENT)
Age: 50
End: 2025-05-04

## 2025-05-05 DIAGNOSIS — G43.109 MIGRAINE WITH AURA AND WITHOUT STATUS MIGRAINOSUS, NOT INTRACTABLE: ICD-10-CM

## 2025-05-06 NOTE — TELEPHONE ENCOUNTER
Last Written Prescription Date:  7/3/24  Last Fill Quantity: 8,  # refills: 11   Last office visit: 1/11/2024 ; last virtual visit: Visit date not found   Future Office Visit: 7/11/25    Routing refill request to provider for review/approval because:  Drug not on the [facility] refill protocol     MYRIAM Galvez, BSN  [facility]

## 2025-07-07 DIAGNOSIS — G43.709 CHRONIC MIGRAINE WITHOUT AURA WITHOUT STATUS MIGRAINOSUS, NOT INTRACTABLE: Primary | ICD-10-CM
